# Patient Record
Sex: FEMALE | Race: WHITE | NOT HISPANIC OR LATINO | Employment: UNEMPLOYED | URBAN - METROPOLITAN AREA
[De-identification: names, ages, dates, MRNs, and addresses within clinical notes are randomized per-mention and may not be internally consistent; named-entity substitution may affect disease eponyms.]

---

## 2023-03-28 LAB — SEDIMENTATION RATE, ERYTHROCYTE: 49 MM/H (ref 0–20)

## 2023-04-11 LAB
C REACTIVE PROTEIN (MG/L) IN SER/PLAS: 1.53 MG/DL
SEDIMENTATION RATE, ERYTHROCYTE: 66 MM/H (ref 0–20)

## 2023-04-14 LAB — CITRULLINE ANTIBODY, IGG: 4 UNITS (ref 0–19)

## 2023-05-01 ENCOUNTER — APPOINTMENT (OUTPATIENT)
Dept: LAB | Facility: LAB | Age: 48
End: 2023-05-01
Payer: COMMERCIAL

## 2023-05-01 LAB
C REACTIVE PROTEIN (MG/L) IN SER/PLAS: 1.42 MG/DL
ERYTHROCYTE DISTRIBUTION WIDTH (RATIO) BY AUTOMATED COUNT: 14.7 % (ref 11.5–14.5)
ERYTHROCYTE MEAN CORPUSCULAR HEMOGLOBIN CONCENTRATION (G/DL) BY AUTOMATED: 31.1 G/DL (ref 32–36)
ERYTHROCYTE MEAN CORPUSCULAR VOLUME (FL) BY AUTOMATED COUNT: 84 FL (ref 80–100)
ERYTHROCYTES (10*6/UL) IN BLOOD BY AUTOMATED COUNT: 4.36 X10E12/L (ref 4–5.2)
HEMATOCRIT (%) IN BLOOD BY AUTOMATED COUNT: 36.7 % (ref 36–46)
HEMOGLOBIN (G/DL) IN BLOOD: 11.4 G/DL (ref 12–16)
LEUKOCYTES (10*3/UL) IN BLOOD BY AUTOMATED COUNT: 9.6 X10E9/L (ref 4.4–11.3)
NRBC (PER 100 WBCS) BY AUTOMATED COUNT: 0 /100 WBC (ref 0–0)
PLATELETS (10*3/UL) IN BLOOD AUTOMATED COUNT: 417 X10E9/L (ref 150–450)
SEDIMENTATION RATE, ERYTHROCYTE: 49 MM/H (ref 0–20)

## 2023-08-14 LAB
ALANINE AMINOTRANSFERASE (SGPT) (U/L) IN SER/PLAS: 19 U/L (ref 7–45)
ALBUMIN (G/DL) IN SER/PLAS: 4.2 G/DL (ref 3.4–5)
ALKALINE PHOSPHATASE (U/L) IN SER/PLAS: 94 U/L (ref 33–110)
ANION GAP IN SER/PLAS: 13 MMOL/L (ref 10–20)
ASPARTATE AMINOTRANSFERASE (SGOT) (U/L) IN SER/PLAS: 15 U/L (ref 9–39)
BILIRUBIN DIRECT (MG/DL) IN SER/PLAS: 0 MG/DL (ref 0–0.3)
BILIRUBIN TOTAL (MG/DL) IN SER/PLAS: 0.6 MG/DL (ref 0–1.2)
CALCIUM (MG/DL) IN SER/PLAS: 9.1 MG/DL (ref 8.6–10.3)
CARBON DIOXIDE, TOTAL (MMOL/L) IN SER/PLAS: 26 MMOL/L (ref 21–32)
CHLORIDE (MMOL/L) IN SER/PLAS: 104 MMOL/L (ref 98–107)
CREATININE (MG/DL) IN SER/PLAS: 0.84 MG/DL (ref 0.5–1.05)
ESTIMATED AVERAGE GLUCOSE FOR HBA1C: 180 MG/DL
GFR FEMALE: 85 ML/MIN/1.73M2
GLUCOSE (MG/DL) IN SER/PLAS: 134 MG/DL (ref 74–99)
HEMOGLOBIN A1C/HEMOGLOBIN TOTAL IN BLOOD: 7.9 %
POTASSIUM (MMOL/L) IN SER/PLAS: 4 MMOL/L (ref 3.5–5.3)
PROTEIN TOTAL: 7.3 G/DL (ref 6.4–8.2)
SODIUM (MMOL/L) IN SER/PLAS: 139 MMOL/L (ref 136–145)
UREA NITROGEN (MG/DL) IN SER/PLAS: 10 MG/DL (ref 6–23)

## 2023-09-14 ENCOUNTER — OFFICE VISIT (OUTPATIENT)
Dept: PRIMARY CARE | Facility: CLINIC | Age: 48
End: 2023-09-14
Payer: COMMERCIAL

## 2023-09-14 VITALS
WEIGHT: 237 LBS | BODY MASS INDEX: 37.2 KG/M2 | TEMPERATURE: 97 F | DIASTOLIC BLOOD PRESSURE: 76 MMHG | HEIGHT: 67 IN | HEART RATE: 90 BPM | SYSTOLIC BLOOD PRESSURE: 126 MMHG | OXYGEN SATURATION: 97 %

## 2023-09-14 DIAGNOSIS — E11.65 TYPE 2 DIABETES MELLITUS WITH HYPERGLYCEMIA, WITHOUT LONG-TERM CURRENT USE OF INSULIN (MULTI): Primary | ICD-10-CM

## 2023-09-14 DIAGNOSIS — F32.A ANXIETY AND DEPRESSION: ICD-10-CM

## 2023-09-14 DIAGNOSIS — F41.9 ANXIETY AND DEPRESSION: ICD-10-CM

## 2023-09-14 PROCEDURE — 3051F HG A1C>EQUAL 7.0%<8.0%: CPT | Performed by: INTERNAL MEDICINE

## 2023-09-14 PROCEDURE — 3074F SYST BP LT 130 MM HG: CPT | Performed by: INTERNAL MEDICINE

## 2023-09-14 PROCEDURE — 1036F TOBACCO NON-USER: CPT | Performed by: INTERNAL MEDICINE

## 2023-09-14 PROCEDURE — 3078F DIAST BP <80 MM HG: CPT | Performed by: INTERNAL MEDICINE

## 2023-09-14 PROCEDURE — 99213 OFFICE O/P EST LOW 20 MIN: CPT | Performed by: INTERNAL MEDICINE

## 2023-09-14 RX ORDER — MONTELUKAST SODIUM 10 MG/1
10 TABLET ORAL DAILY
COMMUNITY

## 2023-09-14 RX ORDER — FLUTICASONE PROPIONATE 50 MCG
SPRAY, SUSPENSION (ML) NASAL
COMMUNITY
Start: 2023-06-22 | End: 2024-03-28 | Stop reason: ALTCHOICE

## 2023-09-14 RX ORDER — ONDANSETRON 4 MG/1
4 TABLET, ORALLY DISINTEGRATING ORAL EVERY 8 HOURS PRN
COMMUNITY
Start: 2022-12-05 | End: 2024-02-20 | Stop reason: ALTCHOICE

## 2023-09-14 RX ORDER — PREGABALIN 50 MG/1
1 CAPSULE ORAL DAILY
COMMUNITY
Start: 2014-09-17

## 2023-09-14 RX ORDER — BLOOD-GLUCOSE TRANSMITTER
EACH MISCELLANEOUS
COMMUNITY
Start: 2023-08-14 | End: 2023-11-15 | Stop reason: SDUPTHER

## 2023-09-14 RX ORDER — KETOCONAZOLE 20 MG/ML
SHAMPOO, SUSPENSION TOPICAL EVERY OTHER DAY
COMMUNITY
End: 2024-01-15

## 2023-09-14 RX ORDER — LEVOTHYROXINE SODIUM 112 UG/1
112 TABLET ORAL DAILY
COMMUNITY

## 2023-09-14 RX ORDER — BLOOD-GLUCOSE SENSOR
EACH MISCELLANEOUS
COMMUNITY
Start: 2023-09-09

## 2023-09-14 RX ORDER — PAROXETINE 10 MG/1
10 TABLET, FILM COATED ORAL
COMMUNITY
End: 2023-12-14 | Stop reason: ALTCHOICE

## 2023-09-14 RX ORDER — FEXOFENADINE HCL AND PSEUDOEPHEDRINE HCI 180; 240 MG/1; MG/1
1 TABLET, EXTENDED RELEASE ORAL DAILY
COMMUNITY

## 2023-09-14 RX ORDER — FOLIC ACID 1 MG/1
1 TABLET ORAL DAILY
COMMUNITY
End: 2024-01-15 | Stop reason: SDUPTHER

## 2023-09-14 RX ORDER — ALBUTEROL SULFATE 90 UG/1
2 AEROSOL, METERED RESPIRATORY (INHALATION) 3 TIMES DAILY PRN
COMMUNITY
Start: 2022-05-31 | End: 2024-03-21 | Stop reason: SDUPTHER

## 2023-09-14 RX ORDER — IPRATROPIUM BROMIDE 17 UG/1
AEROSOL, METERED RESPIRATORY (INHALATION)
COMMUNITY

## 2023-09-14 RX ORDER — ERGOCALCIFEROL 1.25 MG/1
1 CAPSULE ORAL
COMMUNITY
End: 2024-04-29

## 2023-09-14 RX ORDER — LEVOTHYROXINE SODIUM 125 UG/1
125 TABLET ORAL
COMMUNITY
Start: 2020-04-07 | End: 2024-03-28 | Stop reason: ALTCHOICE

## 2023-09-14 RX ORDER — CYANOCOBALAMIN 1000 UG/ML
1 INJECTION, SOLUTION INTRAMUSCULAR; SUBCUTANEOUS
COMMUNITY
Start: 2022-02-17 | End: 2023-10-25 | Stop reason: ALTCHOICE

## 2023-09-14 RX ORDER — METRONIDAZOLE 7.5 MG/G
CREAM TOPICAL
COMMUNITY
Start: 2023-05-26 | End: 2024-03-28 | Stop reason: ALTCHOICE

## 2023-09-14 RX ORDER — BLOOD-GLUCOSE,RECEIVER,CONT
EACH MISCELLANEOUS
COMMUNITY
Start: 2023-08-14

## 2023-09-14 RX ORDER — DULAGLUTIDE 0.75 MG/.5ML
0.75 INJECTION, SOLUTION SUBCUTANEOUS
Qty: 2 ML | Refills: 1 | Status: SHIPPED | OUTPATIENT
Start: 2023-09-14 | End: 2023-09-27 | Stop reason: SINTOL

## 2023-09-14 RX ORDER — DULOXETIN HYDROCHLORIDE 30 MG/1
30 CAPSULE, DELAYED RELEASE ORAL DAILY
COMMUNITY

## 2023-09-14 RX ORDER — DESONIDE 0.5 MG/G
OINTMENT TOPICAL DAILY
COMMUNITY
Start: 2018-09-04 | End: 2023-10-25 | Stop reason: SDUPTHER

## 2023-09-14 RX ORDER — CLONAZEPAM 0.5 MG/1
0.5 TABLET ORAL 3 TIMES DAILY
COMMUNITY

## 2023-09-14 ASSESSMENT — PATIENT HEALTH QUESTIONNAIRE - PHQ9
1. LITTLE INTEREST OR PLEASURE IN DOING THINGS: NOT AT ALL
SUM OF ALL RESPONSES TO PHQ9 QUESTIONS 1 AND 2: 0
2. FEELING DOWN, DEPRESSED OR HOPELESS: NOT AT ALL

## 2023-09-14 NOTE — PROGRESS NOTES
"Subjective   Patient ID: Cydney Fox is a 47 y.o. female who presents for Follow-up (Follow up on medication. ).    HPI   Patient is here to discuss diabetes management.  She could not tolerate Jardiance due to GI side effects.  Using Dexcom and trying to change eating habits and has started exercising .  Still having right-sided pain and had a CT scan.  Has follow-up with GI.  CT showed fatty liver and hemangioma  Depression and anxiety controlled now.  Referral to new psychiatrist since current physician is out of network  Still trying to taper off Paxil  Still has ongoing dizziness  Review of Systems  No fever chills  No night sweats  No weight loss    No Rectal bleed  No numbness or tingling of feet  Objective   /76   Pulse 90   Temp 36.1 °C (97 °F)   Ht 1.702 m (5' 7\")   Wt 108 kg (237 lb)   SpO2 97%   BMI 37.12 kg/m²     Physical Exam    NAD  No pallor or icterus  Neck supple  Chest is clear  CVs: S1-S2 regular rate and rhythm no murmur  Abdomen soft mild epigastric and upper quadrant tenderness.  No rebound or guarding.  No mass  Assessment/Plan   Problem List Items Addressed This Visit       Type 2 diabetes mellitus with hyperglycemia, without long-term current use of insulin (CMS/[facility]) - Primary     She could not tolerate Jardiance.  Could not tolerate metformin in the past.  We will try her on Trulicity.  May cause more GI symptoms.  We will change if needed.  No contraindications  Continue exercise and diet plan.         Relevant Medications    dulaglutide (Trulicity) 0.75 mg/0.5 mL pen injector    Anxiety and depression     Symptoms are controlled with current treatment.  Referral to psychiatry         Relevant Orders    Referral to Psychiatry          "

## 2023-09-15 NOTE — ASSESSMENT & PLAN NOTE
She could not tolerate Jardiance.  Could not tolerate metformin in the past.  We will try her on Trulicity.  May cause more GI symptoms.  We will change if needed.  No contraindications  Continue exercise and diet plan.

## 2023-09-21 ENCOUNTER — TELEPHONE (OUTPATIENT)
Dept: PRIMARY CARE | Facility: CLINIC | Age: 48
End: 2023-09-21
Payer: COMMERCIAL

## 2023-09-21 NOTE — TELEPHONE ENCOUNTER
Discussed with patient.  She does not have hypoglycemic symptoms.  Verify with finger clinic and she is seeing lower number.  We will stop medication if she has symptoms or fingersticks shows low sugar

## 2023-09-25 ENCOUNTER — TELEPHONE (OUTPATIENT)
Dept: PRIMARY CARE | Facility: CLINIC | Age: 48
End: 2023-09-25
Payer: COMMERCIAL

## 2023-09-25 PROBLEM — R92.8 ABNORMAL MAMMOGRAM: Status: ACTIVE | Noted: 2023-09-25

## 2023-09-25 PROBLEM — M54.10: Status: ACTIVE | Noted: 2023-09-25

## 2023-09-25 PROBLEM — R11.0 NAUSEA IN ADULT: Status: ACTIVE | Noted: 2023-09-25

## 2023-09-25 PROBLEM — R93.3 ABNORMAL CT SCAN, COLON: Status: ACTIVE | Noted: 2023-09-25

## 2023-09-25 PROBLEM — J38.01 VOCAL CORD PARALYSIS, UNILATERAL COMPLETE: Status: ACTIVE | Noted: 2023-09-25

## 2023-09-25 PROBLEM — B37.9 YEAST INFECTION: Status: ACTIVE | Noted: 2023-09-25

## 2023-09-25 PROBLEM — R42 DIZZINESS: Status: ACTIVE | Noted: 2023-09-25

## 2023-09-25 PROBLEM — N76.0 BACTERIAL VAGINOSIS: Status: ACTIVE | Noted: 2023-09-25

## 2023-09-25 PROBLEM — R26.1 SPASTIC GAIT: Status: ACTIVE | Noted: 2023-09-25

## 2023-09-25 PROBLEM — E55.9 VITAMIN D DEFICIENCY: Status: ACTIVE | Noted: 2023-09-25

## 2023-09-25 PROBLEM — B96.89 BACTERIAL VAGINOSIS: Status: ACTIVE | Noted: 2023-09-25

## 2023-09-25 PROBLEM — M79.621 PAIN OF RIGHT UPPER ARM: Status: ACTIVE | Noted: 2023-09-25

## 2023-09-25 PROBLEM — E89.0 POSTOPERATIVE HYPOTHYROIDISM: Status: ACTIVE | Noted: 2023-09-25

## 2023-09-25 PROBLEM — R19.7 DIARRHEA: Status: ACTIVE | Noted: 2023-09-25

## 2023-09-25 PROBLEM — R73.09 ABNORMAL GLUCOSE: Status: ACTIVE | Noted: 2023-09-25

## 2023-09-25 PROBLEM — I77.1 CELIAC ARTERY STENOSIS (CMS-HCC): Status: ACTIVE | Noted: 2023-09-25

## 2023-09-25 PROBLEM — R14.0 ABDOMINAL BLOATING: Status: ACTIVE | Noted: 2023-09-25

## 2023-09-25 PROBLEM — I77.4 CELIAC ARTERY STENOSIS: Status: ACTIVE | Noted: 2023-09-25

## 2023-09-25 PROBLEM — M65.939 FCR (FLEXOR CARPI RADIALIS) TENOSYNOVITIS: Status: ACTIVE | Noted: 2023-06-21

## 2023-09-25 PROBLEM — N95.1 MENOPAUSAL SYMPTOM: Status: ACTIVE | Noted: 2023-09-25

## 2023-09-25 PROBLEM — R55 SYNCOPE: Status: ACTIVE | Noted: 2023-09-25

## 2023-09-25 PROBLEM — R74.01 TRANSAMINITIS: Status: ACTIVE | Noted: 2023-09-25

## 2023-09-25 PROBLEM — R73.9 HYPERGLYCEMIA: Status: ACTIVE | Noted: 2023-09-25

## 2023-09-25 PROBLEM — Z98.890 S/P TOTAL THYROIDECTOMY: Status: ACTIVE | Noted: 2023-09-25

## 2023-09-25 PROBLEM — R79.89 ABNORMAL COMPLETE BLOOD COUNT: Status: ACTIVE | Noted: 2023-09-25

## 2023-09-25 PROBLEM — J02.9 SORETHROAT: Status: ACTIVE | Noted: 2023-09-25

## 2023-09-25 PROBLEM — R06.02 SHORTNESS OF BREATH ON EXERTION: Status: ACTIVE | Noted: 2023-09-25

## 2023-09-25 PROBLEM — E53.8 FOLIC ACID DEFICIENCY: Status: ACTIVE | Noted: 2023-09-25

## 2023-09-25 PROBLEM — S46.311D: Status: ACTIVE | Noted: 2023-09-25

## 2023-09-25 PROBLEM — R26.81 UNSTEADY GAIT: Status: ACTIVE | Noted: 2023-09-25

## 2023-09-25 PROBLEM — R11.10 VOMITING: Status: ACTIVE | Noted: 2022-10-04

## 2023-09-25 PROBLEM — K27.9 PEPTIC ULCERATION: Status: ACTIVE | Noted: 2023-09-25

## 2023-09-25 PROBLEM — M75.01 ADHESIVE CAPSULITIS OF RIGHT SHOULDER: Status: ACTIVE | Noted: 2023-09-25

## 2023-09-25 PROBLEM — T21.15XA: Status: ACTIVE | Noted: 2023-09-25

## 2023-09-25 PROBLEM — S46.219A BICEPS MUSCLE STRAIN: Status: ACTIVE | Noted: 2023-09-25

## 2023-09-25 PROBLEM — M47.24 THORACIC RADICULOPATHY DUE TO DEGENERATIVE JOINT DISEASE OF SPINE: Status: ACTIVE | Noted: 2023-09-25

## 2023-09-25 PROBLEM — N92.6 IRREGULAR BLEEDING: Status: ACTIVE | Noted: 2023-09-25

## 2023-09-25 PROBLEM — J30.9 ALLERGIC RHINITIS: Status: ACTIVE | Noted: 2023-09-25

## 2023-09-25 PROBLEM — M25.539 WRIST PAIN: Status: ACTIVE | Noted: 2023-03-16

## 2023-09-25 PROBLEM — K29.70 GASTRITIS: Status: ACTIVE | Noted: 2023-09-25

## 2023-09-25 PROBLEM — N91.5 OLIGOMENORRHEA: Status: ACTIVE | Noted: 2023-09-25

## 2023-09-25 PROBLEM — N83.202 CYST OF LEFT OVARY: Status: ACTIVE | Noted: 2023-09-25

## 2023-09-25 PROBLEM — Z90.89 S/P TOTAL THYROIDECTOMY: Status: ACTIVE | Noted: 2023-09-25

## 2023-09-25 PROBLEM — C73 THYROID CANCER (MULTI): Status: ACTIVE | Noted: 2023-09-25

## 2023-09-25 PROBLEM — D32.9 MENINGIOMA (MULTI): Status: ACTIVE | Noted: 2023-09-25

## 2023-09-25 PROBLEM — R63.5 WEIGHT GAIN: Status: ACTIVE | Noted: 2023-09-25

## 2023-09-25 PROBLEM — J02.9 SORETHROAT: Status: RESOLVED | Noted: 2023-09-25 | Resolved: 2023-09-25

## 2023-09-25 PROBLEM — E87.6 HYPOKALEMIA: Status: ACTIVE | Noted: 2023-09-25

## 2023-09-25 PROBLEM — Z79.1 PATIENT TAKES NSAID (NON-STEROID ANTI-INFLAMMATORY DRUG): Status: ACTIVE | Noted: 2023-09-25

## 2023-09-25 PROBLEM — G89.4 PAIN SYNDROME, CHRONIC: Status: ACTIVE | Noted: 2023-07-20

## 2023-09-25 PROBLEM — N76.0 VAGINITIS: Status: ACTIVE | Noted: 2023-09-25

## 2023-09-25 PROBLEM — R10.13 ABDOMINAL PAIN, ACUTE, EPIGASTRIC: Status: ACTIVE | Noted: 2023-09-25

## 2023-09-25 PROBLEM — M25.511 RIGHT SHOULDER PAIN: Status: ACTIVE | Noted: 2023-09-25

## 2023-09-25 PROBLEM — R29.898 WEAKNESS OF BOTH LOWER EXTREMITIES: Status: ACTIVE | Noted: 2023-09-25

## 2023-09-25 PROBLEM — G95.9 CERVICAL MYELOPATHY (MULTI): Status: ACTIVE | Noted: 2023-09-25

## 2023-09-25 PROBLEM — M79.642 PAIN OF LEFT HAND: Status: ACTIVE | Noted: 2023-09-25

## 2023-09-25 PROBLEM — M54.40 LUMBAGO WITH SCIATICA: Status: ACTIVE | Noted: 2023-09-25

## 2023-09-25 PROBLEM — R10.13 ABDOMINAL PAIN, ACUTE, EPIGASTRIC: Status: RESOLVED | Noted: 2023-09-25 | Resolved: 2023-09-25

## 2023-09-25 PROBLEM — R10.30 LOWER ABDOMINAL PAIN: Status: ACTIVE | Noted: 2022-10-04

## 2023-09-25 PROBLEM — F32.A DEPRESSION: Status: ACTIVE | Noted: 2023-09-25

## 2023-09-25 PROBLEM — J32.9 RECURRENT SINUSITIS: Status: ACTIVE | Noted: 2023-09-25

## 2023-09-25 PROBLEM — F41.9 ANXIETY: Status: ACTIVE | Noted: 2023-09-25

## 2023-09-25 PROBLEM — M77.8 TENDONITIS OF WRIST, RIGHT: Status: ACTIVE | Noted: 2023-09-25

## 2023-09-25 PROBLEM — R49.0 DYSPHONIA: Status: ACTIVE | Noted: 2023-09-25

## 2023-09-25 PROBLEM — E78.2 HYPERLIPIDEMIA, MIXED: Status: ACTIVE | Noted: 2023-09-25

## 2023-09-25 PROBLEM — M65.9 TENOSYNOVITIS OF RIGHT HAND: Status: ACTIVE | Noted: 2023-03-23

## 2023-09-25 PROBLEM — M79.10 MYALGIA: Status: ACTIVE | Noted: 2023-09-25

## 2023-09-25 PROBLEM — R93.89 ABNORMAL CT SCAN, NECK: Status: ACTIVE | Noted: 2023-09-25

## 2023-09-25 PROBLEM — R10.2 ADNEXAL TENDERNESS: Status: ACTIVE | Noted: 2023-09-25

## 2023-09-25 PROBLEM — R13.10 DYSPHAGIA: Status: ACTIVE | Noted: 2023-09-25

## 2023-09-25 PROBLEM — L21.9 SEBORRHEIC DERMATITIS: Status: ACTIVE | Noted: 2023-09-25

## 2023-09-25 PROBLEM — M65.9 FCR (FLEXOR CARPI RADIALIS) TENOSYNOVITIS: Status: ACTIVE | Noted: 2023-06-21

## 2023-09-25 PROBLEM — L03.031 PARONYCHIA OF TOE OF RIGHT FOOT: Status: ACTIVE | Noted: 2023-09-25

## 2023-09-25 PROBLEM — G90.511 COMPLEX REGIONAL PAIN SYNDROME TYPE 1 OF RIGHT UPPER EXTREMITY: Status: ACTIVE | Noted: 2023-06-21

## 2023-09-25 PROBLEM — M19.90 ARTHRITIS: Status: ACTIVE | Noted: 2023-09-25

## 2023-09-25 PROBLEM — E89.0 S/P TOTAL THYROIDECTOMY: Status: ACTIVE | Noted: 2023-09-25

## 2023-09-25 PROBLEM — M47.14 THORACIC MYELOPATHY: Status: ACTIVE | Noted: 2023-09-25

## 2023-09-25 PROBLEM — J31.0 CHRONIC RHINITIS: Status: ACTIVE | Noted: 2023-09-25

## 2023-09-25 PROBLEM — M25.50 ARTHRALGIA: Status: ACTIVE | Noted: 2023-09-25

## 2023-09-25 PROBLEM — J45.909 ASTHMA (HHS-HCC): Status: ACTIVE | Noted: 2023-09-25

## 2023-09-25 PROBLEM — M54.6 THORACIC SPINE PAIN: Status: ACTIVE | Noted: 2023-09-25

## 2023-09-25 PROBLEM — K55.9 MESENTERIC ISCHEMIA (MULTI): Status: ACTIVE | Noted: 2023-09-25

## 2023-09-25 PROBLEM — R11.2 NAUSEA AND VOMITING: Status: ACTIVE | Noted: 2023-09-25

## 2023-09-25 PROBLEM — E53.8 B12 DEFICIENCY: Status: ACTIVE | Noted: 2023-09-25

## 2023-09-25 PROBLEM — Q38.1 ANKYLOGLOSSIA: Status: ACTIVE | Noted: 2023-09-25

## 2023-09-25 PROBLEM — M54.17 LUMBOSACRAL NEURITIS: Status: ACTIVE | Noted: 2023-09-25

## 2023-09-25 PROBLEM — M79.7 FIBROMYALGIA: Status: ACTIVE | Noted: 2023-09-25

## 2023-09-25 PROBLEM — R19.5 LOOSE STOOLS: Status: ACTIVE | Noted: 2022-10-04

## 2023-09-25 PROBLEM — R10.2 PELVIC PAIN IN FEMALE: Status: ACTIVE | Noted: 2023-09-25

## 2023-09-25 PROBLEM — R52 GENERALIZED PAIN: Status: ACTIVE | Noted: 2023-09-25

## 2023-09-25 PROBLEM — M25.60 MULTIPLE STIFF JOINTS: Status: ACTIVE | Noted: 2023-09-25

## 2023-09-25 PROBLEM — M65.941 TENOSYNOVITIS OF RIGHT HAND: Status: ACTIVE | Noted: 2023-03-23

## 2023-09-25 PROBLEM — N76.0 VULVOVAGINITIS: Status: ACTIVE | Noted: 2023-09-25

## 2023-09-25 PROBLEM — H60.90 OTITIS EXTERNA: Status: ACTIVE | Noted: 2023-09-25

## 2023-09-25 PROBLEM — M51.26 HERNIATION OF INTERVERTEBRAL DISC OF LUMBAR SPINE: Status: ACTIVE | Noted: 2023-09-25

## 2023-09-25 PROBLEM — R59.0 CERVICAL LYMPHADENOPATHY: Status: ACTIVE | Noted: 2023-09-25

## 2023-09-25 PROBLEM — L65.9 HAIR LOSS: Status: ACTIVE | Noted: 2023-09-25

## 2023-09-25 PROBLEM — S46.219A BICEPS MUSCLE TEAR: Status: ACTIVE | Noted: 2023-09-25

## 2023-09-25 PROBLEM — R73.03 PREDIABETES: Status: ACTIVE | Noted: 2023-09-25

## 2023-09-25 RX ORDER — CYANOCOBALAMIN (VITAMIN B-12) 3000MCG/ML
DROPS SUBLINGUAL
COMMUNITY
End: 2023-10-25 | Stop reason: ALTCHOICE

## 2023-09-25 RX ORDER — FAMOTIDINE 10 MG/1
10 TABLET ORAL 2 TIMES DAILY
COMMUNITY
End: 2024-03-28 | Stop reason: ALTCHOICE

## 2023-09-25 RX ORDER — PAROXETINE 10 MG/1
1.5 TABLET, FILM COATED ORAL EVERY OTHER DAY
COMMUNITY

## 2023-09-25 NOTE — TELEPHONE ENCOUNTER
Patients blood sugar has been very low since she took her first Trulicity injection. Right after it was at 60, right now it is 150 but she is concerned about taking a another one since her sugar is starting lower than last time. She wants to know if she should be lowering her dose or something else. Please advise.    Statement Selected

## 2023-09-27 DIAGNOSIS — E11.65 TYPE 2 DIABETES MELLITUS WITH HYPERGLYCEMIA, WITHOUT LONG-TERM CURRENT USE OF INSULIN (MULTI): Primary | ICD-10-CM

## 2023-09-27 NOTE — TELEPHONE ENCOUNTER
Patient is nervous about trying to control blood sugar with diet alone. She said that you mentioned trying a different medication if the Trulicity didn't work.

## 2023-10-05 ENCOUNTER — OFFICE VISIT (OUTPATIENT)
Dept: OTOLARYNGOLOGY | Facility: CLINIC | Age: 48
End: 2023-10-05
Payer: COMMERCIAL

## 2023-10-05 VITALS
BODY MASS INDEX: 36.92 KG/M2 | HEART RATE: 86 BPM | WEIGHT: 235.25 LBS | TEMPERATURE: 98.1 F | RESPIRATION RATE: 16 BRPM | DIASTOLIC BLOOD PRESSURE: 76 MMHG | HEIGHT: 67 IN | SYSTOLIC BLOOD PRESSURE: 114 MMHG

## 2023-10-05 DIAGNOSIS — R42 DIZZINESS: Primary | ICD-10-CM

## 2023-10-05 PROCEDURE — 99203 OFFICE O/P NEW LOW 30 MIN: CPT | Performed by: STUDENT IN AN ORGANIZED HEALTH CARE EDUCATION/TRAINING PROGRAM

## 2023-10-05 PROCEDURE — 3078F DIAST BP <80 MM HG: CPT | Performed by: STUDENT IN AN ORGANIZED HEALTH CARE EDUCATION/TRAINING PROGRAM

## 2023-10-05 PROCEDURE — 99213 OFFICE O/P EST LOW 20 MIN: CPT | Performed by: STUDENT IN AN ORGANIZED HEALTH CARE EDUCATION/TRAINING PROGRAM

## 2023-10-05 PROCEDURE — 1036F TOBACCO NON-USER: CPT | Performed by: STUDENT IN AN ORGANIZED HEALTH CARE EDUCATION/TRAINING PROGRAM

## 2023-10-05 PROCEDURE — 3051F HG A1C>EQUAL 7.0%<8.0%: CPT | Performed by: STUDENT IN AN ORGANIZED HEALTH CARE EDUCATION/TRAINING PROGRAM

## 2023-10-05 PROCEDURE — 3074F SYST BP LT 130 MM HG: CPT | Performed by: STUDENT IN AN ORGANIZED HEALTH CARE EDUCATION/TRAINING PROGRAM

## 2023-10-05 ASSESSMENT — PATIENT HEALTH QUESTIONNAIRE - PHQ9: 2. FEELING DOWN, DEPRESSED OR HOPELESS: NOT AT ALL

## 2023-10-05 ASSESSMENT — ENCOUNTER SYMPTOMS
LOSS OF SENSATION IN FEET: 0
DIZZINESS: 1
OCCASIONAL FEELINGS OF UNSTEADINESS: 0
DEPRESSION: 0

## 2023-10-05 ASSESSMENT — COLUMBIA-SUICIDE SEVERITY RATING SCALE - C-SSRS
2. HAVE YOU ACTUALLY HAD ANY THOUGHTS OF KILLING YOURSELF?: NO
6. HAVE YOU EVER DONE ANYTHING, STARTED TO DO ANYTHING, OR PREPARED TO DO ANYTHING TO END YOUR LIFE?: NO

## 2023-10-05 ASSESSMENT — PAIN SCALES - GENERAL: PAINLEVEL: 0-NO PAIN

## 2023-10-05 NOTE — PROGRESS NOTES
Subjective   Patient ID: Cydney Fox is a 48 y.o. female who presents for Dizziness.  She reports faintness and dizziness since May 2023. She reports that when she gets up from a seated position she will feel faint. Has also had episodes when walking. She reports room spinning vertigo and light-headedness as though she will pass out and fall. Gets some stabbing pain involving her whole head. She is also appreciating 3-5 minutes of bilateral pulsatile tinnitus which then resolve. She notes sensitive ear canals; as a history of environmental allergies. No history of ear surgery. Father has hearing loss; potentially work exposure.    Working with Dr. Zhang who has already obtained an audiogram (normal) and brain imaging (small meningioma, MRA normal). She has a pending VNG order. Autonomic testing was normal.    Dizziness        Review of Systems   Neurological:  Positive for dizziness.   All other systems reviewed and are negative.  Except as noted on patient intake form.    Objective   Physical Exam  Constitutional:       Appearance: Normal appearance.      Comments: Slight hoarseness with high pitch; patient previously seen by my colleague, Dr. Jane Longo, vocal cord paralysis.   HENT:      Right Ear: Tympanic membrane and external ear normal.      Left Ear: Tympanic membrane and external ear normal.      Ears:      Comments: Narrow ears with some dry cerumen.  Pulmonary:      Effort: Pulmonary effort is normal.   Neurological:      General: No focal deficit present.      Mental Status: She is alert.      Coordination: Romberg sign negative. Finger-Nose-Finger Test and Heel to Shin Test normal.      Comments: PERRLA  EOMI  No nystagmus  Finger to nose mild tremor with left UE  Fukuda Step Test normal - slight unsteadiness       Audiology:  I personally reviewed the patient's audiogram from today.  This demonstrated normal hearing with excellent word recognition scores, type a tympanograms, and preserved  acoustic reflexes bilaterally.     Radiology:  I reviewed the results of the patient prior MRI and MRA brain. These are reportedly normal with a small meningioma appreciated along the dura of the right falx.    Assessment/Plan   Problem List Items Addressed This Visit             ICD-10-CM    Dizziness - Primary R42     The patient reports symptoms of faintness, vertigo, and light-headedness. These episodes will occur randomly, but are more common when she stands.  She has had multiple normal exams including normal brain imaging, autonomic testing, and audiogram. Her physical exam demonstrated no obvious vestibular deficit.    The balance system was discussed in detail, and the etiologies of imbalance and dizziness were explained.  We discussed that the patient's symptoms and physical exam findings were not clearly suggestive of a vestibular insult.  I agree with Dr. Zhang's current plan which include VNG and further cardiac work-up. Depending on those results PT may be of benefit.  She can otherwise see me as needed.

## 2023-10-05 NOTE — ASSESSMENT & PLAN NOTE
The patient reports symptoms of faintness, vertigo, and light-headedness. These episodes will occur randomly, but are more common when she stands.  She has had multiple normal exams including normal brain imaging, autonomic testing, and audiogram. Her physical exam demonstrated no obvious vestibular deficit.    The balance system was discussed in detail, and the etiologies of imbalance and dizziness were explained.  We discussed that the patient's symptoms and physical exam findings were not clearly suggestive of a vestibular insult.  I agree with Dr. Zhang's current plan which include VNG and further cardiac work-up. Depending on those results PT may be of benefit.  She can otherwise see me as needed.

## 2023-10-05 NOTE — LETTER
October 5, 2023     Nicole Reza MD  [address]  [address]  [place] [address]    Patient: Cydney Fox   YOB: 1975   Date of Visit: 10/5/2023       Dear Dr. Nicole Reza MD:    Thank you for referring Cydney Fox to me for evaluation. Below are my notes for this consultation.  If you have questions, please do not hesitate to call me. I look forward to following your patient along with you.       Sincerely,     Rafa Armando MD      CC: Todd Zhang MD  ______________________________________________________________________________________    Subjective  Patient ID: Cydney Fox is a 48 y.o. female who presents for Dizziness.  She reports faintness and dizziness since May 2023. She reports that when she gets up from a seated position she will feel faint. Has also had episodes when walking. She reports room spinning vertigo and light-headedness as though she will pass out and fall. Gets some stabbing pain involving her whole head. She is also appreciating 3-5 minutes of bilateral pulsatile tinnitus which then resolve. She notes sensitive ear canals; as a history of environmental allergies. No history of ear surgery. Father has hearing loss; potentially work exposure.    Working with Dr. Zhang who has already obtained an audiogram (normal) and brain imaging (small meningioma, MRA normal). She has a pending VNG order. Autonomic testing was normal.    Dizziness        Review of Systems   Neurological:  Positive for dizziness.   All other systems reviewed and are negative.  Except as noted on patient intake form.    Objective  Physical Exam  Constitutional:       Appearance: Normal appearance.      Comments: Slight hoarseness with high pitch; patient previously seen by my colleague, Dr. Jane Longo, vocal cord paralysis.   HENT:      Right Ear: Tympanic membrane and external ear normal.      Left Ear: Tympanic membrane and external ear normal.      Ears:       Comments: Narrow ears with some dry cerumen.  Pulmonary:      Effort: Pulmonary effort is normal.   Neurological:      General: No focal deficit present.      Mental Status: She is alert.      Coordination: Romberg sign negative. Finger-Nose-Finger Test and Heel to Shin Test normal.      Comments: PERRLA  EOMI  No nystagmus  Finger to nose mild tremor with left UE  Fukuda Step Test normal - slight unsteadiness       Audiology:  I personally reviewed the patient's audiogram from today.  This demonstrated normal hearing with excellent word recognition scores, type a tympanograms, and preserved acoustic reflexes bilaterally.     Radiology:  I reviewed the results of the patient prior MRI and MRA brain. These are reportedly normal with a small meningioma appreciated along the dura of the right falx.    Assessment/Plan  Problem List Items Addressed This Visit             ICD-10-CM    Dizziness - Primary R42     The patient reports symptoms of faintness, vertigo, and light-headedness. These episodes will occur randomly, but are more common when she stands.  She has had multiple normal exams including normal brain imaging, autonomic testing, and audiogram. Her physical exam demonstrated no obvious vestibular deficit.    The balance system was discussed in detail, and the etiologies of imbalance and dizziness were explained.  We discussed that the patient's symptoms and physical exam findings were not clearly suggestive of a vestibular insult.  I agree with Dr. Zhang's current plan which include VNG and further cardiac work-up. Depending on those results PT may be of benefit.  She can otherwise see me as needed.

## 2023-10-18 ENCOUNTER — APPOINTMENT (OUTPATIENT)
Dept: PRIMARY CARE | Facility: CLINIC | Age: 48
End: 2023-10-18
Payer: COMMERCIAL

## 2023-10-25 ENCOUNTER — OFFICE VISIT (OUTPATIENT)
Dept: PRIMARY CARE | Facility: CLINIC | Age: 48
End: 2023-10-25
Payer: COMMERCIAL

## 2023-10-25 VITALS
OXYGEN SATURATION: 97 % | SYSTOLIC BLOOD PRESSURE: 118 MMHG | HEART RATE: 75 BPM | BODY MASS INDEX: 36.73 KG/M2 | DIASTOLIC BLOOD PRESSURE: 80 MMHG | WEIGHT: 234 LBS | HEIGHT: 67 IN

## 2023-10-25 DIAGNOSIS — L21.9 SEBORRHEIC DERMATITIS: ICD-10-CM

## 2023-10-25 DIAGNOSIS — E11.65 TYPE 2 DIABETES MELLITUS WITH HYPERGLYCEMIA, WITHOUT LONG-TERM CURRENT USE OF INSULIN (MULTI): Primary | ICD-10-CM

## 2023-10-25 PROCEDURE — 3051F HG A1C>EQUAL 7.0%<8.0%: CPT | Performed by: INTERNAL MEDICINE

## 2023-10-25 PROCEDURE — 3079F DIAST BP 80-89 MM HG: CPT | Performed by: INTERNAL MEDICINE

## 2023-10-25 PROCEDURE — 3074F SYST BP LT 130 MM HG: CPT | Performed by: INTERNAL MEDICINE

## 2023-10-25 PROCEDURE — 99213 OFFICE O/P EST LOW 20 MIN: CPT | Performed by: INTERNAL MEDICINE

## 2023-10-25 PROCEDURE — 1036F TOBACCO NON-USER: CPT | Performed by: INTERNAL MEDICINE

## 2023-10-25 RX ORDER — METFORMIN HYDROCHLORIDE 500 MG/1
500 TABLET, EXTENDED RELEASE ORAL
Qty: 30 TABLET | Refills: 11 | Status: SHIPPED | OUTPATIENT
Start: 2023-10-25 | End: 2024-02-20 | Stop reason: ALTCHOICE

## 2023-10-25 RX ORDER — DESONIDE 0.5 MG/G
OINTMENT TOPICAL DAILY
Qty: 15 G | Refills: 0 | Status: SHIPPED | OUTPATIENT
Start: 2023-10-25

## 2023-10-25 ASSESSMENT — PATIENT HEALTH QUESTIONNAIRE - PHQ9
SUM OF ALL RESPONSES TO PHQ9 QUESTIONS 1 AND 2: 5
6. FEELING BAD ABOUT YOURSELF - OR THAT YOU ARE A FAILURE OR HAVE LET YOURSELF OR YOUR FAMILY DOWN: SEVERAL DAYS
SUM OF ALL RESPONSES TO PHQ QUESTIONS 1-9: 13
3. TROUBLE FALLING OR STAYING ASLEEP OR SLEEPING TOO MUCH: MORE THAN HALF THE DAYS
1. LITTLE INTEREST OR PLEASURE IN DOING THINGS: NEARLY EVERY DAY
2. FEELING DOWN, DEPRESSED OR HOPELESS: MORE THAN HALF THE DAYS
9. THOUGHTS THAT YOU WOULD BE BETTER OFF DEAD, OR OF HURTING YOURSELF: NOT AT ALL
8. MOVING OR SPEAKING SO SLOWLY THAT OTHER PEOPLE COULD HAVE NOTICED. OR THE OPPOSITE, BEING SO FIGETY OR RESTLESS THAT YOU HAVE BEEN MOVING AROUND A LOT MORE THAN USUAL: MORE THAN HALF THE DAYS
4. FEELING TIRED OR HAVING LITTLE ENERGY: NEARLY EVERY DAY
7. TROUBLE CONCENTRATING ON THINGS, SUCH AS READING THE NEWSPAPER OR WATCHING TELEVISION: NOT AT ALL
10. IF YOU CHECKED OFF ANY PROBLEMS, HOW DIFFICULT HAVE THESE PROBLEMS MADE IT FOR YOU TO DO YOUR WORK, TAKE CARE OF THINGS AT HOME, OR GET ALONG WITH OTHER PEOPLE: EXTREMELY DIFFICULT
5. POOR APPETITE OR OVEREATING: NOT AT ALL

## 2023-10-25 NOTE — PROGRESS NOTES
"Subjective   Patient ID: Cydney Fox is a 48 y.o. female who presents for Diabetes (Discuss diabetes medication ).    HPI   Januvia caused a rash.Jardiance cause flank pain  Trulicity caused low sugar  Using dexcom  Watching diet  Fasting blood sugar 115average.does go up.  Still waiting to see psychiatrist  Dizziness is persisting.  Seen ENT  Working with pain management  Review of Systems  No fever or chills  No nausea vomiting or abdominal pain  Objective   /80   Pulse 75   Ht 1.702 m (5' 7\")   Wt 106 kg (234 lb)   SpO2 97%   BMI 36.65 kg/m²     Physical Exam  In NAD  No pallor or icterus  Chest is clear  CVS: S1-S2 regular rate and rhythm no edema  Assessment/Plan   Problem List Items Addressed This Visit             ICD-10-CM    Type 2 diabetes mellitus with hyperglycemia, without long-term current use of insulin (CMS/[facility]) - Primary E11.65    Relevant Medications    metFORMIN XR (Glucophage-XR) 500 mg 24 hr tablet    Seborrheic dermatitis L21.9    Relevant Medications    desonide (DesOwen) 0.05 % ointment        Continue diet control and increase activity as tolerated  We will try metformin 500 mg once a day  Continue monitoring  Call me with any side effects  "

## 2023-10-30 ENCOUNTER — TELEPHONE (OUTPATIENT)
Dept: PRIMARY CARE | Facility: CLINIC | Age: 48
End: 2023-10-30
Payer: COMMERCIAL

## 2023-10-30 NOTE — TELEPHONE ENCOUNTER
Patient would like to know what can she take in place of the Metformin ? She is not agreeing with her symptoms

## 2023-11-01 DIAGNOSIS — E11.65 TYPE 2 DIABETES MELLITUS WITH HYPERGLYCEMIA, WITHOUT LONG-TERM CURRENT USE OF INSULIN (MULTI): Primary | ICD-10-CM

## 2023-11-01 RX ORDER — PEN NEEDLE, DIABETIC 30 GX3/16"
NEEDLE, DISPOSABLE MISCELLANEOUS
Qty: 30 EACH | Refills: 0 | Status: SHIPPED | OUTPATIENT
Start: 2023-11-01 | End: 2024-02-20 | Stop reason: ALTCHOICE

## 2023-11-01 NOTE — TELEPHONE ENCOUNTER
Discussed with the patient.  We will try week dose.  Call me with any side effects.  Start with 0.6 mg daily

## 2023-11-01 NOTE — TELEPHONE ENCOUNTER
Patient did not realize it is an injectable med. She does not want this kind. Does it come in pill form?

## 2023-11-14 ENCOUNTER — APPOINTMENT (OUTPATIENT)
Dept: PRIMARY CARE | Facility: CLINIC | Age: 48
End: 2023-11-14
Payer: COMMERCIAL

## 2023-11-15 ENCOUNTER — TELEPHONE (OUTPATIENT)
Dept: PRIMARY CARE | Facility: CLINIC | Age: 48
End: 2023-11-15
Payer: COMMERCIAL

## 2023-11-15 DIAGNOSIS — E11.65 TYPE 2 DIABETES MELLITUS WITH HYPERGLYCEMIA, WITHOUT LONG-TERM CURRENT USE OF INSULIN (MULTI): Primary | ICD-10-CM

## 2023-11-15 RX ORDER — BLOOD-GLUCOSE TRANSMITTER
EACH MISCELLANEOUS
Qty: 1 EACH | Refills: 0 | Status: SHIPPED | OUTPATIENT
Start: 2023-11-15

## 2023-11-15 NOTE — TELEPHONE ENCOUNTER
Patient needs a prescription for a new dexcom G6 transmitter. It should be sent to the [facility] on file.

## 2023-11-22 DIAGNOSIS — E11.65 TYPE 2 DIABETES MELLITUS WITH HYPERGLYCEMIA, WITHOUT LONG-TERM CURRENT USE OF INSULIN (MULTI): Primary | ICD-10-CM

## 2023-11-22 RX ORDER — NATEGLINIDE 60 MG/1
60 TABLET ORAL
Qty: 60 TABLET | Refills: 1 | Status: SHIPPED | OUTPATIENT
Start: 2023-11-22 | End: 2024-01-29

## 2023-11-22 NOTE — TELEPHONE ENCOUNTER
Patient called in again regarding her diabetes. I see that the PA has been started but she is saying that she feels very sick and it's been two weeks without any medication. She would like to know what should be doing during this time while waiting ? She also mentioned that her insurance covers generic medications and that she would like to not continue the process of the Semaglutide.

## 2023-12-05 ENCOUNTER — APPOINTMENT (OUTPATIENT)
Dept: PRIMARY CARE | Facility: CLINIC | Age: 48
End: 2023-12-05
Payer: COMMERCIAL

## 2023-12-14 ENCOUNTER — OFFICE VISIT (OUTPATIENT)
Dept: PRIMARY CARE | Facility: CLINIC | Age: 48
End: 2023-12-14
Payer: COMMERCIAL

## 2023-12-14 VITALS
OXYGEN SATURATION: 97 % | DIASTOLIC BLOOD PRESSURE: 85 MMHG | WEIGHT: 233 LBS | HEIGHT: 67 IN | HEART RATE: 79 BPM | SYSTOLIC BLOOD PRESSURE: 125 MMHG | BODY MASS INDEX: 36.57 KG/M2 | TEMPERATURE: 97.9 F

## 2023-12-14 DIAGNOSIS — Z12.31 ENCOUNTER FOR SCREENING MAMMOGRAM FOR BREAST CANCER: ICD-10-CM

## 2023-12-14 DIAGNOSIS — M79.7 FIBROMYALGIA: ICD-10-CM

## 2023-12-14 DIAGNOSIS — E11.65 TYPE 2 DIABETES MELLITUS WITH HYPERGLYCEMIA, WITHOUT LONG-TERM CURRENT USE OF INSULIN (MULTI): Primary | ICD-10-CM

## 2023-12-14 PROBLEM — E11.42: Status: ACTIVE | Noted: 2023-11-29

## 2023-12-14 PROBLEM — M79.2 NEUROPATHIC PAIN: Status: ACTIVE | Noted: 2023-09-21

## 2023-12-14 PROBLEM — K76.0 FATTY LIVER: Status: ACTIVE | Noted: 2023-08-29

## 2023-12-14 LAB — POC HEMOGLOBIN A1C: 6.9 % (ref 4.2–6.5)

## 2023-12-14 PROCEDURE — 83036 HEMOGLOBIN GLYCOSYLATED A1C: CPT | Performed by: INTERNAL MEDICINE

## 2023-12-14 PROCEDURE — 3079F DIAST BP 80-89 MM HG: CPT | Performed by: INTERNAL MEDICINE

## 2023-12-14 PROCEDURE — 3074F SYST BP LT 130 MM HG: CPT | Performed by: INTERNAL MEDICINE

## 2023-12-14 PROCEDURE — 1036F TOBACCO NON-USER: CPT | Performed by: INTERNAL MEDICINE

## 2023-12-14 PROCEDURE — 99213 OFFICE O/P EST LOW 20 MIN: CPT | Performed by: INTERNAL MEDICINE

## 2023-12-14 PROCEDURE — 3051F HG A1C>EQUAL 7.0%<8.0%: CPT | Performed by: INTERNAL MEDICINE

## 2023-12-14 RX ORDER — EVENING PRIMROSE OIL 500 MG
CAPSULE ORAL DAILY
COMMUNITY
End: 2023-12-14 | Stop reason: ALTCHOICE

## 2023-12-14 RX ORDER — VITAMIN E MIXED 400 UNIT
CAPSULE ORAL DAILY
COMMUNITY

## 2023-12-14 RX ORDER — VITAMIN E MIXED 400 UNIT
400 CAPSULE ORAL DAILY
COMMUNITY
End: 2023-12-14 | Stop reason: ALTCHOICE

## 2023-12-14 ASSESSMENT — ENCOUNTER SYMPTOMS
HEADACHES: 0
DIFFICULTY URINATING: 0
WEAKNESS: 0
TREMORS: 0
UNEXPECTED WEIGHT CHANGE: 0
ACTIVITY CHANGE: 0
VOMITING: 0
DIARRHEA: 0
DIZZINESS: 1
NAUSEA: 0

## 2023-12-14 ASSESSMENT — PATIENT HEALTH QUESTIONNAIRE - PHQ9
2. FEELING DOWN, DEPRESSED OR HOPELESS: NOT AT ALL
SUM OF ALL RESPONSES TO PHQ9 QUESTIONS 1 AND 2: 0
1. LITTLE INTEREST OR PLEASURE IN DOING THINGS: NOT AT ALL

## 2023-12-14 NOTE — PROGRESS NOTES
"Subjective   Patient ID: Cydney Fox is a 48 y.o. female who presents for Follow-up (A1c also has tendinitis in her arm she wants looked at.).    HPI   On nateglinide  Bs still high  Watching diet  Seen endo  Thyroid checked  Still has pain allover  Has tenonitis arm    Review of Systems   Constitutional:  Negative for activity change and unexpected weight change.   Gastrointestinal:  Negative for diarrhea, nausea and vomiting.   Genitourinary:  Negative for difficulty urinating.   Neurological:  Positive for dizziness. Negative for tremors, weakness and headaches.       Objective   /85   Pulse 79   Temp 36.6 °C (97.9 °F)   Ht 1.702 m (5' 7\")   Wt 106 kg (233 lb)   SpO2 97%   BMI 36.49 kg/m²     Physical Exam  Constitutional:       Appearance: Normal appearance.   Cardiovascular:      Rate and Rhythm: Normal rate and regular rhythm.      Heart sounds: Normal heart sounds.   Pulmonary:      Breath sounds: Normal breath sounds.   Musculoskeletal:         General: Tenderness present.   Neurological:      Mental Status: She is alert.     Diffuse muscle tenderness    Assessment/Plan   Problem List Items Addressed This Visit             ICD-10-CM    Type 2 diabetes mellitus with hyperglycemia, without long-term current use of insulin (CMS/[facility]) - Primary E11.65    Relevant Orders    POCT glycosylated hemoglobin (Hb A1C) manually resulted (Completed)    Fibromyalgia M79.7    Relevant Orders    Referral to Physical Therapy     Diabetes control is improving.  Continue same treatment.  Need to start exercise  She has generalized pain and tendinitis.  Apply topical diclofenac  Referral to aquatic therapy   Schedule the mammogram  She is following up with endocrinology for thyroid cancer and diabetes.  Follow-up in February for annual checkup     "

## 2024-01-03 ENCOUNTER — ANCILLARY PROCEDURE (OUTPATIENT)
Dept: RADIOLOGY | Facility: CLINIC | Age: 49
End: 2024-01-03
Payer: COMMERCIAL

## 2024-01-03 DIAGNOSIS — Z12.31 ENCOUNTER FOR SCREENING MAMMOGRAM FOR BREAST CANCER: ICD-10-CM

## 2024-01-03 PROCEDURE — 77063 BREAST TOMOSYNTHESIS BI: CPT | Performed by: RADIOLOGY

## 2024-01-03 PROCEDURE — 77067 SCR MAMMO BI INCL CAD: CPT | Performed by: RADIOLOGY

## 2024-01-03 PROCEDURE — 77067 SCR MAMMO BI INCL CAD: CPT

## 2024-01-04 ENCOUNTER — TELEPHONE (OUTPATIENT)
Dept: PRIMARY CARE | Facility: CLINIC | Age: 49
End: 2024-01-04
Payer: COMMERCIAL

## 2024-01-04 NOTE — TELEPHONE ENCOUNTER
----- Message from Nicole Reza MD sent at 1/3/2024 12:59 PM EST -----  Advice Mammogram came back normal    Pt aware of the results.

## 2024-02-19 ASSESSMENT — PROMIS GLOBAL HEALTH SCALE
RATE_SOCIAL_SATISFACTION: VERY GOOD
RATE_GENERAL_HEALTH: POOR
CARRYOUT_SOCIAL_ACTIVITIES: FAIR
RATE_AVERAGE_PAIN: 8
EMOTIONAL_PROBLEMS: OFTEN
CARRYOUT_PHYSICAL_ACTIVITIES: A LITTLE
RATE_AVERAGE_FATIGUE: VERY SEVERE
RATE_PHYSICAL_HEALTH: POOR
RATE_QUALITY_OF_LIFE: POOR
RATE_MENTAL_HEALTH: FAIR

## 2024-02-20 ENCOUNTER — OFFICE VISIT (OUTPATIENT)
Dept: PRIMARY CARE | Facility: CLINIC | Age: 49
End: 2024-02-20
Payer: COMMERCIAL

## 2024-02-20 VITALS
RESPIRATION RATE: 20 BRPM | HEIGHT: 67 IN | OXYGEN SATURATION: 97 % | DIASTOLIC BLOOD PRESSURE: 74 MMHG | HEART RATE: 81 BPM | TEMPERATURE: 97.6 F | SYSTOLIC BLOOD PRESSURE: 113 MMHG | WEIGHT: 233.3 LBS | BODY MASS INDEX: 36.62 KG/M2

## 2024-02-20 DIAGNOSIS — Z00.00 ANNUAL PHYSICAL EXAM: Primary | ICD-10-CM

## 2024-02-20 DIAGNOSIS — E53.8 B12 DEFICIENCY: ICD-10-CM

## 2024-02-20 DIAGNOSIS — E11.42 CONTROLLED TYPE 2 DIABETES MELLITUS WITH DIABETIC POLYNEUROPATHY, WITHOUT LONG-TERM CURRENT USE OF INSULIN (MULTI): ICD-10-CM

## 2024-02-20 DIAGNOSIS — R10.30 LOWER ABDOMINAL PAIN: ICD-10-CM

## 2024-02-20 DIAGNOSIS — J45.30 MILD PERSISTENT ASTHMA WITHOUT COMPLICATION (HHS-HCC): ICD-10-CM

## 2024-02-20 DIAGNOSIS — G82.20 PARAPARESIS (MULTI): ICD-10-CM

## 2024-02-20 DIAGNOSIS — M79.7 FIBROMYALGIA: ICD-10-CM

## 2024-02-20 DIAGNOSIS — D32.9 MENINGIOMA (MULTI): ICD-10-CM

## 2024-02-20 PROCEDURE — 3074F SYST BP LT 130 MM HG: CPT | Performed by: INTERNAL MEDICINE

## 2024-02-20 PROCEDURE — 1036F TOBACCO NON-USER: CPT | Performed by: INTERNAL MEDICINE

## 2024-02-20 PROCEDURE — 3078F DIAST BP <80 MM HG: CPT | Performed by: INTERNAL MEDICINE

## 2024-02-20 PROCEDURE — 99396 PREV VISIT EST AGE 40-64: CPT | Performed by: INTERNAL MEDICINE

## 2024-02-20 ASSESSMENT — PAIN SCALES - GENERAL: PAINLEVEL: 2

## 2024-02-20 ASSESSMENT — COLUMBIA-SUICIDE SEVERITY RATING SCALE - C-SSRS: 1. IN THE PAST MONTH, HAVE YOU WISHED YOU WERE DEAD OR WISHED YOU COULD GO TO SLEEP AND NOT WAKE UP?: NO

## 2024-02-20 NOTE — PROGRESS NOTES
Subjective   Patient ID: Cydney Fox is a 48 y.o. female who presents for physical  HPI    Patient is here for annual exam  Has to see surgeon since new nodules seen on thyroid usg  Last exam was one  year ago, sees ophthalmologist regularly.    Consumes healthy diet    Does some  regular exercise,can only do few minutes at5 a time  pregnancy historyg0  No bladder symptoms  Cervical cancer screen current normal 2023  No history of abnormal Pap  Mammogram done  Colonoscopy yet to be completed      Review of Systems  GENERAL: No fever or chills.just tired  HEENT: No headache, no dizziness, no visual disturbance. No hearing loss. No nosebleeds. No sore throat or sinus pain. No ringing in ears.  RESP: No cough, has shortness of breath with exertion  CARDIO: No chest pain, palpitation, leg swelling. No PND or orthopnea.  GI: Has chronic abdominal pain,no nausea, vomiting, diarrhea, or constipation.had ct  : No dysuria, hematuria, or frequent urination. No incontinence  MSKl: has muscle pain  ENDO: No polyuria, polydipsia, no cold intolerance.  DERM:No rashes or itching.  PSYCH:Has anxiety and depression,controlled on meds.sees psych   HISTORY  Social History     Socioeconomic History    Marital status: Single     Spouse name: Not on file    Number of children: Not on file    Years of education: Not on file    Highest education level: Not on file   Occupational History    Not on file   Tobacco Use    Smoking status: Never    Smokeless tobacco: Never   Substance and Sexual Activity    Alcohol use: Not Currently    Drug use: Never    Sexual activity: Not on file   Other Topics Concern    Not on file   Social History Narrative    Not on file     Social Determinants of Health     Financial Resource Strain: Not on file   Food Insecurity: Not on file   Transportation Needs: Not on file   Physical Activity: Not on file   Stress: Not on file   Social Connections: Not on file   Intimate Partner Violence: Not on file    Housing Stability: Not on file     Family History   Problem Relation Name Age of Onset    Thyroid cancer Mother      Fibromyalgia Mother      Hypothyroidism Mother      Diabetes Father      Hyperlipidemia Father      Hypertension Father      Lung cancer Maternal Grandmother      Other (partial thyroidectomy) Paternal Grandmother      Breast cancer Father's Sister  65    Allergies Other Family History     Hypertension Other Family History     Hearing loss Other Family History      Past Medical History:   Diagnosis Date    Abdominal distension (gaseous) 02/16/2016    Abdominal bloating    Abnormal levels of other serum enzymes 02/16/2016    Elevated liver enzymes    Acute pharyngitis, unspecified 12/26/2014    Sore throat    Acute upper respiratory infection, unspecified 03/20/2018    Acute URI    Acute upper respiratory infection, unspecified 04/12/2017    Acute URI    Calculus of gallbladder without cholecystitis without obstruction 03/29/2016    Cholelithiasis without obstruction    Chondrocostal junction syndrome (tietze)     Costochondritis    Depression, unspecified 07/01/2022    Depression    Disease of gallbladder, unspecified     Gallbladder problem    Impacted cerumen, left ear 07/16/2019    Impacted cerumen of left ear    Localized enlarged lymph nodes 11/15/2017    Cervical lymphadenopathy    Nasal congestion     Nasal congestion    Noninfective gastroenteritis and colitis, unspecified 04/08/2016    Gastroenteritis, acute    Other amnesia     Memory problem    Other diseases of pharynx     Irritated throat    Otorrhea, unspecified ear     Ear drainage    Pelvic and perineal pain 06/30/2016    Adnexal tenderness    Personal history of diseases of the blood and blood-forming organs and certain disorders involving the immune mechanism 02/05/2014    History of leukocytosis    Personal history of malignant neoplasm of thyroid     History of malignant neoplasm of thyroid    Personal history of other diseases  of the circulatory system     History of mitral valve prolapse    Personal history of other diseases of the digestive system 08/16/2021    History of gastritis    Personal history of other diseases of the digestive system 02/05/2014    History of gastritis    Personal history of other diseases of the female genital tract 06/28/2016    History of irregular menstrual cycles    Personal history of other diseases of the musculoskeletal system and connective tissue     History of arthritis    Personal history of other diseases of the musculoskeletal system and connective tissue     Personal history of fibromyalgia    Personal history of other diseases of the respiratory system 03/29/2016    History of acute bronchitis    Personal history of other diseases of the respiratory system     Personal history of asthma    Personal history of other specified conditions     History of headache    Personal history of other specified conditions     History of shortness of breath    Personal history of other specified conditions 02/24/2016    History of nausea    Personal history of other specified conditions     History of wheezing    Personal history of other specified conditions 02/22/2016    History of abdominal pain    Sneezing     Sneezing    Unspecified asthma with (acute) exacerbation 03/28/2016    Asthma exacerbation    Unspecified epiphora, bilateral     Watery eyes     Past Surgical History:   Procedure Laterality Date    CHOLECYSTECTOMY  03/28/2016    Cholecystectomy    MR HEAD ANGIO WO IV CONTRAST  9/16/2022    MR HEAD ANGIO WO IV CONTRAST 9/16/2022 [facility]    MR NECK ANGIO WO IV CONTRAST  9/16/2022    MR NECK ANGIO WO IV CONTRAST 9/16/2022 [facility]    OTHER SURGICAL HISTORY  09/16/2021    Colonoscopy    TOTAL THYROIDECTOMY  12/15/2015    Thyroid Surgery Total Thyroidectomy       Objective   /74 (BP Location: Right arm, Patient Position: Sitting, BP Cuff Size: Adult)   Pulse 81   Temp 36.4 °C (97.6  "°F) (Temporal)   Resp 20   Ht 1.702 m (5' 7\")   Wt 106 kg (233 lb 4.8 oz)   SpO2 97%   BMI 36.54 kg/m²      Lab Results   Component Value Date    WBC 9.6 05/01/2023    HGB 11.4 (L) 05/01/2023    HCT 36.7 05/01/2023    MCV 84 05/01/2023     05/01/2023      Physical Exam  Constitutional: Alert and in no acute distress. Well developed, well nourished.   Head and Face: Head and face: Normal.    Eyes: Normal external exam. Pupils were equal in size, round, reactive to light (PERRL) with normal accommodation and extraocular movements intact (EOMI).   Ears, Nose, Mouth, and Throat: External inspection of ears and nose: Normal.  Hearing: Normal.  Nasal mucosa, septum, and turbinates: Normal.  Lips, teeth, and gums: Normal.  Oropharynx: Normal.    Neck: Neck: Abnormal.  scar. thyroidecotmy.   Cardiovascular: Heart rate and rhythm were normal, normal S1 and S2, no gallops, no murmurs and no pericardial rub. Carotid pulses: Normal with no bruits. Pedal pulses: Normal. No peripheral edema.   Pulmonary: No respiratory distress. Clear bilateral breath sounds.   Abdomen: Abdomen: Abnormal.  diffuse tenderness which is c/c. No organomegaly.   Musculoskeletal: No joint swelling seen, normal movements of all extremities. Range of motion: Normal.  Muscle strength/tone: Normal.    Skin: Normal skin color and pigmentation, normal skin turgor, and no rash.   Psychiatric: Judgment and insight: Intact. Mood and affect: Normal.   Lymphatic: No cervical lymphadenopathy.   Assessment/Plan   Problem List Items Addressed This Visit       Asthma    Relevant Orders    Disability Placard    B12 deficiency    Relevant Orders    Vitamin B12    Lower abdominal pain    Relevant Orders    Breath Hydrogen Test-Bacterial Overgrowth    Meningioma (CMS/HCC)    Fibromyalgia    Relevant Orders    Disability Placard    Paraparesis (CMS/HCC)    Annual physical exam - Primary    Relevant Orders    CBC and Auto Differential    Comprehensive Metabolic " Panel    Lipid Panel    Controlled type 2 diabetes mellitus with diabetic polyneuropathy (CMS/HCC)    Relevant Orders    CBC and Auto Differential    Comprehensive Metabolic Panel   Preventive exam and counseling completed.  She has multiple ongoing medical issues  Recent endocrinology evaluation showed nodes by her thyroid.  Scheduled to have CT neck and follow-up with surgeon which has been scheduled  Has ongoing pain and sees pain management  Diabetes is controlled and thyroid levels have been checked  Seeing psychiatry  Sees neurosurgery annually for meningioma follow-up  Had to schedule colonoscopy.  Had multiple times with prep  Routine labs ordered  Recommended to continue with diet changes and exercise as tolerated.  Try to avoid processed foods sugar gluten and dairy.  Eat more plant-based foods.  Will do a breath test to assess for SIBO  She has chronic abdominal pain and tenderness  Follow-up in 1 year and as needed

## 2024-02-26 DIAGNOSIS — E11.65 TYPE 2 DIABETES MELLITUS WITH HYPERGLYCEMIA, WITHOUT LONG-TERM CURRENT USE OF INSULIN (MULTI): ICD-10-CM

## 2024-02-26 RX ORDER — NATEGLINIDE 60 MG/1
60 TABLET ORAL
Qty: 60 TABLET | Refills: 0 | OUTPATIENT
Start: 2024-02-26 | End: 2024-03-27

## 2024-02-27 NOTE — TELEPHONE ENCOUNTER
Spoke with Cydney.  She said the script for Starlix (nateglinide) was straightened out with her endo.

## 2024-02-29 ENCOUNTER — OFFICE VISIT (OUTPATIENT)
Dept: NEUROLOGY | Facility: CLINIC | Age: 49
End: 2024-02-29
Payer: COMMERCIAL

## 2024-02-29 ENCOUNTER — LAB (OUTPATIENT)
Dept: LAB | Facility: LAB | Age: 49
End: 2024-02-29
Payer: COMMERCIAL

## 2024-02-29 VITALS
SYSTOLIC BLOOD PRESSURE: 126 MMHG | DIASTOLIC BLOOD PRESSURE: 82 MMHG | HEART RATE: 110 BPM | BODY MASS INDEX: 36.35 KG/M2 | RESPIRATION RATE: 16 BRPM | HEIGHT: 67 IN | TEMPERATURE: 97.6 F | WEIGHT: 231.6 LBS

## 2024-02-29 DIAGNOSIS — R55 SYNCOPE, UNSPECIFIED SYNCOPE TYPE: ICD-10-CM

## 2024-02-29 DIAGNOSIS — M47.14 THORACIC MYELOPATHY: ICD-10-CM

## 2024-02-29 DIAGNOSIS — G95.9 CERVICAL MYELOPATHY (MULTI): ICD-10-CM

## 2024-02-29 DIAGNOSIS — F32.A ANXIETY AND DEPRESSION: ICD-10-CM

## 2024-02-29 DIAGNOSIS — R42 DIZZINESS: ICD-10-CM

## 2024-02-29 DIAGNOSIS — G90.511 COMPLEX REGIONAL PAIN SYNDROME TYPE 1 OF RIGHT UPPER EXTREMITY: ICD-10-CM

## 2024-02-29 DIAGNOSIS — R26.81 UNSTEADY GAIT: ICD-10-CM

## 2024-02-29 DIAGNOSIS — M79.2 NEUROPATHIC PAIN: ICD-10-CM

## 2024-02-29 DIAGNOSIS — E11.42 CONTROLLED TYPE 2 DIABETES MELLITUS WITH DIABETIC POLYNEUROPATHY, WITHOUT LONG-TERM CURRENT USE OF INSULIN (MULTI): ICD-10-CM

## 2024-02-29 DIAGNOSIS — M54.6 THORACIC SPINE PAIN: ICD-10-CM

## 2024-02-29 DIAGNOSIS — E53.8 B12 DEFICIENCY: ICD-10-CM

## 2024-02-29 DIAGNOSIS — F41.9 ANXIETY AND DEPRESSION: ICD-10-CM

## 2024-02-29 DIAGNOSIS — M54.17 LUMBOSACRAL NEURITIS: ICD-10-CM

## 2024-02-29 DIAGNOSIS — Z00.00 ANNUAL PHYSICAL EXAM: ICD-10-CM

## 2024-02-29 DIAGNOSIS — R42 DIZZINESS: Primary | ICD-10-CM

## 2024-02-29 DIAGNOSIS — M79.10 MYALGIA: ICD-10-CM

## 2024-02-29 DIAGNOSIS — M79.7 FIBROMYALGIA: ICD-10-CM

## 2024-02-29 LAB
ALBUMIN SERPL BCP-MCNC: 4.4 G/DL (ref 3.4–5)
ALP SERPL-CCNC: 108 U/L (ref 33–110)
ALT SERPL W P-5'-P-CCNC: 19 U/L (ref 7–45)
ANION GAP SERPL CALC-SCNC: 15 MMOL/L (ref 10–20)
AST SERPL W P-5'-P-CCNC: 19 U/L (ref 9–39)
BASOPHILS # BLD AUTO: 0.06 X10*3/UL (ref 0–0.1)
BASOPHILS NFR BLD AUTO: 0.7 %
BILIRUB SERPL-MCNC: 0.7 MG/DL (ref 0–1.2)
BUN SERPL-MCNC: 11 MG/DL (ref 6–23)
CALCIUM SERPL-MCNC: 9 MG/DL (ref 8.6–10.3)
CHLORIDE SERPL-SCNC: 103 MMOL/L (ref 98–107)
CHOLEST SERPL-MCNC: 267 MG/DL (ref 0–199)
CHOLESTEROL/HDL RATIO: 4.6
CO2 SERPL-SCNC: 27 MMOL/L (ref 21–32)
CREAT SERPL-MCNC: 0.87 MG/DL (ref 0.5–1.05)
EGFRCR SERPLBLD CKD-EPI 2021: 82 ML/MIN/1.73M*2
EOSINOPHIL # BLD AUTO: 0.17 X10*3/UL (ref 0–0.7)
EOSINOPHIL NFR BLD AUTO: 1.9 %
ERYTHROCYTE [DISTWIDTH] IN BLOOD BY AUTOMATED COUNT: 15.4 % (ref 11.5–14.5)
GLUCOSE SERPL-MCNC: 127 MG/DL (ref 74–99)
HCT VFR BLD AUTO: 40.3 % (ref 36–46)
HDLC SERPL-MCNC: 58.3 MG/DL
HGB BLD-MCNC: 12.8 G/DL (ref 12–16)
IMM GRANULOCYTES # BLD AUTO: 0.04 X10*3/UL (ref 0–0.7)
IMM GRANULOCYTES NFR BLD AUTO: 0.5 % (ref 0–0.9)
LDLC SERPL CALC-MCNC: 178 MG/DL
LYMPHOCYTES # BLD AUTO: 1.98 X10*3/UL (ref 1.2–4.8)
LYMPHOCYTES NFR BLD AUTO: 22.4 %
MCH RBC QN AUTO: 26.7 PG (ref 26–34)
MCHC RBC AUTO-ENTMCNC: 31.8 G/DL (ref 32–36)
MCV RBC AUTO: 84 FL (ref 80–100)
MONOCYTES # BLD AUTO: 0.55 X10*3/UL (ref 0.1–1)
MONOCYTES NFR BLD AUTO: 6.2 %
NEUTROPHILS # BLD AUTO: 6.04 X10*3/UL (ref 1.2–7.7)
NEUTROPHILS NFR BLD AUTO: 68.3 %
NON HDL CHOLESTEROL: 209 MG/DL (ref 0–149)
NRBC BLD-RTO: 0 /100 WBCS (ref 0–0)
PLATELET # BLD AUTO: 494 X10*3/UL (ref 150–450)
POTASSIUM SERPL-SCNC: 4.8 MMOL/L (ref 3.5–5.3)
PROT SERPL-MCNC: 7 G/DL (ref 6.4–8.2)
RBC # BLD AUTO: 4.79 X10*6/UL (ref 4–5.2)
SODIUM SERPL-SCNC: 140 MMOL/L (ref 136–145)
TRIGL SERPL-MCNC: 152 MG/DL (ref 0–149)
VIT B12 SERPL-MCNC: 898 PG/ML (ref 211–911)
VLDL: 30 MG/DL (ref 0–40)
WBC # BLD AUTO: 8.8 X10*3/UL (ref 4.4–11.3)

## 2024-02-29 PROCEDURE — 82607 VITAMIN B-12: CPT

## 2024-02-29 PROCEDURE — 1036F TOBACCO NON-USER: CPT | Performed by: PSYCHIATRY & NEUROLOGY

## 2024-02-29 PROCEDURE — 80053 COMPREHEN METABOLIC PANEL: CPT

## 2024-02-29 PROCEDURE — 3079F DIAST BP 80-89 MM HG: CPT | Performed by: PSYCHIATRY & NEUROLOGY

## 2024-02-29 PROCEDURE — 99215 OFFICE O/P EST HI 40 MIN: CPT | Performed by: PSYCHIATRY & NEUROLOGY

## 2024-02-29 PROCEDURE — 80061 LIPID PANEL: CPT

## 2024-02-29 PROCEDURE — 3074F SYST BP LT 130 MM HG: CPT | Performed by: PSYCHIATRY & NEUROLOGY

## 2024-02-29 PROCEDURE — 36415 COLL VENOUS BLD VENIPUNCTURE: CPT

## 2024-02-29 PROCEDURE — 85025 COMPLETE CBC W/AUTO DIFF WBC: CPT

## 2024-02-29 RX ORDER — MAGNESIUM 250 MG
250 TABLET ORAL DAILY
COMMUNITY
End: 2024-03-28 | Stop reason: ALTCHOICE

## 2024-02-29 ASSESSMENT — ENCOUNTER SYMPTOMS
DEPRESSION: 0
OCCASIONAL FEELINGS OF UNSTEADINESS: 0
LOSS OF SENSATION IN FEET: 0

## 2024-02-29 NOTE — PROGRESS NOTES
Subjective     Cydney Fox 48 y.o.  HPI  The patient states that she is still having episodes of dizziness that occur about 4-5 times a day and will last for about 1 minute.  The patient states that they typically will occur when she gets up from sitting or lying down.    The patient states that she is still having back pain that she rates at approximately a 6 out of 10.    The patient is still having severe muscle aches and is now following with pain management.  The patient is compliant with her Cymbalta and Lyrica.    The patient did see Dr. Armando in ENT who did not feel that the patient had a vestibular basis for dizziness.    The patient's bone scan was normal.  The patient's EEG was normal.  The patient did not get her VNG, echocardiogram or autonomic testing.    The patient states that she is seeing an endocrinologist to ensure that her thyroid cancer has not returned.  She does have a CT scan of the neck coming up.    Review of Systems   All other systems reviewed and are negative.       Patient Active Problem List   Diagnosis    Type 2 diabetes mellitus with hyperglycemia, without long-term current use of insulin (CMS/HCC)    Anxiety and depression    Abnormal complete blood count    Abnormal CT scan, colon    Abnormal CT scan, neck    Abnormal mammogram    Abnormal glucose    Adhesive capsulitis of right shoulder    Tenosynovitis of right hand    Adnexal tenderness    Chronic rhinitis    Anxiety    Asthma    B12 deficiency    Bacterial vaginosis    Biceps muscle tear    Celiac artery stenosis (CMS/HCC)    Cervical lymphadenopathy    Cervical myelopathy (CMS/HCC)    Thoracic myelopathy    Complex regional pain syndrome type 1 of right upper extremity    Cyst of left ovary    Diarrhea    Dizziness    Dysphagia    Dysphonia    FCR (flexor carpi radialis) tenosynovitis    First degree burn of buttock    Gastritis    Vaginitis    Vulvovaginitis    Generalized pain    Hair loss    Herniation of  intervertebral disc of lumbar spine    Hyperlipidemia, mixed    Hypokalemia    Impingement of vertebral body syndrome    Irregular bleeding    Loose stools    Lower abdominal pain    Lumbago with sciatica    Lumbosacral neuritis    Meningioma (CMS/HCC)    Menopausal symptom    Mesenteric ischemia (CMS/HCC)    Oligomenorrhea    Otitis externa    Pain of left hand    Fibromyalgia    Pain syndrome, chronic    Paronychia of toe of right foot    Pelvic pain in female    Nausea and vomiting    Nausea in adult    Peptic ulceration    Vomiting    Postoperative hypothyroidism    Prediabetes    Recurrent sinusitis    Pain of right upper arm    Right shoulder pain    S/P total thyroidectomy    Seborrheic dermatitis    Shortness of breath on exertion    Spastic gait    Biceps muscle strain    Syncope    Thoracic radiculopathy due to degenerative joint disease of spine    Thyroid cancer (CMS/HCC)    Transaminitis    Unsteady gait    Folic acid deficiency    Vitamin D deficiency    Vocal cord paralysis, unilateral complete    Paraparesis (CMS/HCC)    Weight gain    Wrist pain    Yeast infection    Depression    Abdominal bloating    Allergic rhinitis    Ankyloglossia    Arthralgia    Arthritis    Hyperglycemia    Multiple stiff joints    Myalgia    Annual physical exam    Strain of triceps muscle, right, subsequent encounter    Tendonitis of wrist, right    Thoracic spine pain    Fatty liver    Neuropathic pain    Controlled type 2 diabetes mellitus with diabetic polyneuropathy (CMS/HCC)        Past Medical History:   Diagnosis Date    Abdominal distension (gaseous) 02/16/2016    Abdominal bloating    Abnormal levels of other serum enzymes 02/16/2016    Elevated liver enzymes    Acute pharyngitis, unspecified 12/26/2014    Sore throat    Acute upper respiratory infection, unspecified 03/20/2018    Acute URI    Acute upper respiratory infection, unspecified 04/12/2017    Acute URI    Calculus of gallbladder without cholecystitis  without obstruction 03/29/2016    Cholelithiasis without obstruction    Chondrocostal junction syndrome (tietze)     Costochondritis    Depression, unspecified 07/01/2022    Depression    Disease of gallbladder, unspecified     Gallbladder problem    Impacted cerumen, left ear 07/16/2019    Impacted cerumen of left ear    Localized enlarged lymph nodes 11/15/2017    Cervical lymphadenopathy    Nasal congestion     Nasal congestion    Noninfective gastroenteritis and colitis, unspecified 04/08/2016    Gastroenteritis, acute    Other amnesia     Memory problem    Other diseases of pharynx     Irritated throat    Otorrhea, unspecified ear     Ear drainage    Pelvic and perineal pain 06/30/2016    Adnexal tenderness    Personal history of diseases of the blood and blood-forming organs and certain disorders involving the immune mechanism 02/05/2014    History of leukocytosis    Personal history of malignant neoplasm of thyroid     History of malignant neoplasm of thyroid    Personal history of other diseases of the circulatory system     History of mitral valve prolapse    Personal history of other diseases of the digestive system 08/16/2021    History of gastritis    Personal history of other diseases of the digestive system 02/05/2014    History of gastritis    Personal history of other diseases of the female genital tract 06/28/2016    History of irregular menstrual cycles    Personal history of other diseases of the musculoskeletal system and connective tissue     History of arthritis    Personal history of other diseases of the musculoskeletal system and connective tissue     Personal history of fibromyalgia    Personal history of other diseases of the respiratory system 03/29/2016    History of acute bronchitis    Personal history of other diseases of the respiratory system     Personal history of asthma    Personal history of other specified conditions     History of headache    Personal history of other specified  conditions     History of shortness of breath    Personal history of other specified conditions 02/24/2016    History of nausea    Personal history of other specified conditions     History of wheezing    Personal history of other specified conditions 02/22/2016    History of abdominal pain    Sneezing     Sneezing    Unspecified asthma with (acute) exacerbation 03/28/2016    Asthma exacerbation    Unspecified epiphora, bilateral     Watery eyes        Past Surgical History:   Procedure Laterality Date    CHOLECYSTECTOMY  03/28/2016    Cholecystectomy    MR HEAD ANGIO WO IV CONTRAST  9/16/2022    MR HEAD ANGIO WO IV CONTRAST 9/16/2022 [facility]    MR NECK ANGIO WO IV CONTRAST  9/16/2022    MR NECK ANGIO WO IV CONTRAST 9/16/2022 [facility]    OTHER SURGICAL HISTORY  09/16/2021    Colonoscopy    TOTAL THYROIDECTOMY  12/15/2015    Thyroid Surgery Total Thyroidectomy        Social History     Socioeconomic History    Marital status: Single     Spouse name: Not on file    Number of children: Not on file    Years of education: Not on file    Highest education level: Not on file   Occupational History    Not on file   Tobacco Use    Smoking status: Never     Passive exposure: Never    Smokeless tobacco: Never   Substance and Sexual Activity    Alcohol use: Not Currently    Drug use: Never    Sexual activity: Not on file   Other Topics Concern    Not on file   Social History Narrative    Not on file     Social Determinants of Health     Financial Resource Strain: Not on file   Food Insecurity: Not on file   Transportation Needs: Not on file   Physical Activity: Not on file   Stress: Not on file   Social Connections: Not on file   Intimate Partner Violence: Not on file   Housing Stability: Not on file        Family History   Problem Relation Name Age of Onset    Thyroid cancer Mother      Fibromyalgia Mother      Hypothyroidism Mother      Diabetes Father      Hyperlipidemia Father      Hypertension Father      Lung  cancer Maternal Grandmother      Other (partial thyroidectomy) Paternal Grandmother      Breast cancer Father's Sister  65    Allergies Other Family History     Hypertension Other Family History     Hearing loss Other Family History         Current Outpatient Medications   Medication Instructions    Atrovent HFA 17 mcg/actuation inhaler INHALE 1 OR 2 PUFFS 4 TIMES DAILY AS NEEDED.    clonazePAM (KLONOPIN) 0.5 mg, oral, 3 times daily    cyanocobalamin, vitamin B-12, (VITAMIN B-12 ORAL) oral, Vitamin b-12 liquid 500mg spray. Use as directed    desonide (DesOwen) 0.05 % ointment Topical, Daily    Dexcom G6  misc USE AS DIRECTED    Dexcom G6 Sensor device CHANGE SENSOR EVERY 10 DAYS AS DIRECTED    Dexcom G6 Transmitter device USE AS DIRECTED    DULoxetine (CYMBALTA) 30 mg, oral, Daily    ergocalciferol (Vitamin D-2) 1.25 MG (89806 UT) capsule 1 capsule, oral, Weekly    famotidine (PEPCID) 10 mg, oral, 2 times daily    fexofenadine-pseudoephedrine (Allegra-D 24) 180-240 mg 24 hr tablet 1 tablet, oral, Daily    fluticasone (Flonase) 50 mcg/actuation nasal spray SPRAY ONCE DAILY INTO NOSTRIL FOR 30 DAYS    folic acid (FOLVITE) 1 mg, oral, Daily    ketoconazole (NIZOral) 2 % shampoo Every other day, Apply to scalp every other day for 5 minutes and rinse    levothyroxine (SYNTHROID, LEVOXYL) 112 mcg, oral, Daily    levothyroxine (SYNTHROID, LEVOXYL) 125 mcg, Take every other day alternate with 112 mcg    magnesium 250 mg, oral, Daily    metroNIDAZOLE (Metrocream) 0.75 % cream APPLY A PEA SIZE AMOUNT TO THE FACE 1-2 TIMES A DAY    montelukast (SINGULAIR) 10 mg, oral, Daily    nateglinide (STARLIX) 60 mg, oral, 2 times daily with meals    PARoxetine (Paxil) 10 mg tablet 1.5 tablets, oral, Every other day    pregabalin (Lyrica) 50 mg capsule 1 capsule, oral, Daily    TURMERIC ROOT-GINGER ROOT EXT ORAL 1 tablet, oral, Daily, 150-25mg oral tablet chewable.    Ventolin HFA 90 mcg/actuation inhaler 2 puffs, inhalation, 3  "times daily PRN    vitamin E 90 mg (200 unit) capsule oral, Daily        Allergies   Allergen Reactions    Jardiance [Empagliflozin] GI Upset    Bee Pollen Other    Codeine Nausea/vomiting    Daypro [Oxaprozin] Unknown    Gluten Unknown    House Dust Other    Milk Containing Products (Dairy) Unknown    Sulfa (Sulfonamide Antibiotics) Unknown, Nausea Only and Other    Cefaclor Rash and Unknown     Other reaction(s): rash childhood        Objective  /82 (BP Location: Left arm)   Pulse 110   Temp 36.4 °C (97.6 °F)   Resp 16   Ht 1.702 m (5' 7\")   Wt 105 kg (231 lb 9.6 oz)   BMI 36.27 kg/m²    GENERAL APPEARANCE:  No distress, alert and cooperative.     MENTAL STATE:  Orientation was normal to time, place and person. Recent and remote memory was intact.  Attention span and concentration were normal. Language testing was normal for comprehension, repetition, expression, and naming. Calculation was intact. The patient could correctly interpret a picture, and copy a diagram. General fund of knowledge was intact. Mini-mental status examination was performed with no errors.     CRANIAL NERVES:  Cranial nerves were normal.      CN 2- Visual Acuity  OD: 20/20 (corrected)   OS: 20/20 (corrected); visual fields full to confrontation.      CN 3, 4, 6-  Pupils round, 4 mm in diameter, equally reactive to light. No ptosis. EOMs normal alignment, full range of movement, no nystagmus     CN 5- Facial sensation intact bilaterally. Normal corneal reflexes.      CN 7- Normal and symmetric facial strength. Nasolabial folds symmetric.     CN 8- Hearing intact to finger rub, whisper.      CN 9- Palate elevates symmetrically. Normal gag reflex.      CN 11- Normal strength of shoulder shrug and neck turning      CN 12- Tongue midline, with normal bulk and strength; no fasciculations.     MOTOR:  Motor exam was normal. Muscle bulk and tone were normal in both upper and lower extremities. Muscle strength was 5/5 in distal and " proximal muscles in both upper and lower extremities. No fasciculations, tremor or other abnormal movements were present.     GAIT: Station was stable with a normal base and negative Romberg sign. Gait was stable with a normal arm swing and speed. No ataxia, shuffling, steppage or waddling was noted. Tandem gait was intact. Postural reflexes were normal.     Assessment/Plan   The patient is still having fairly severe back pain and myalgias.  The patient needs to follow-up with the pain management consult that was put in by rheumatology.  The patient should continue her Cymbalta and Lyrica.  The patient continue symptomatic pain medicines for severe back pain.  The patient is also still complaining of episodes of dizziness.  The patient needs a bone scan, echocardiogram, ENG, EEG and autonomic testing.  The patient needs to stay well-hydrated.  The patient should also follow-up with her primary care doctor for the symptoms.  The patient should continue all of her medicines.  The patient does need to follow-up with her endocrinologist to rule out the possibility of a thyroid cancer recurrence.  I discussed all these issues in detail with the patient and answered all her questions.  The patient will follow up with me in 6 months

## 2024-02-29 NOTE — PATIENT INSTRUCTIONS
The patient is still having fairly severe back pain and myalgias.  The patient needs to follow-up with the pain management consult that was put in by rheumatology.  The patient should continue her Cymbalta and Lyrica.  The patient continue symptomatic pain medicines for severe back pain.  The patient is also still complaining of episodes of dizziness.  The patient needs a bone scan, echocardiogram, ENG, EEG and autonomic testing.  The patient needs to stay well-hydrated.  The patient should also follow-up with her primary care doctor for the symptoms.  The patient should continue all of her medicines.  The patient does need to follow-up with her endocrinologist to rule out the possibility of a thyroid cancer recurrence.  I discussed all these issues in detail with the patient and answered all her questions.  The patient will follow up with me in 6 months

## 2024-03-01 DIAGNOSIS — R79.89 ABNORMAL CBC: Primary | ICD-10-CM

## 2024-03-01 NOTE — RESULT ENCOUNTER NOTE
Cholesterol and ldl coming down.continue diet chages  Platelets slightly high.  Not sure of reason  Please repeat a cb in 2 weeks after drinking fluids,not fasting  Order is in chart

## 2024-03-04 ENCOUNTER — APPOINTMENT (OUTPATIENT)
Dept: NEUROLOGY | Facility: CLINIC | Age: 49
End: 2024-03-04
Payer: COMMERCIAL

## 2024-03-04 ENCOUNTER — HOSPITAL ENCOUNTER (OUTPATIENT)
Dept: CARDIOLOGY | Facility: HOSPITAL | Age: 49
Discharge: HOME | End: 2024-03-04
Payer: COMMERCIAL

## 2024-03-04 DIAGNOSIS — R42 DIZZINESS: ICD-10-CM

## 2024-03-04 DIAGNOSIS — R55 SYNCOPE, UNSPECIFIED SYNCOPE TYPE: ICD-10-CM

## 2024-03-04 PROCEDURE — 93005 ELECTROCARDIOGRAM TRACING: CPT

## 2024-03-06 LAB
ATRIAL RATE: 84 BPM
P AXIS: 48 DEGREES
P OFFSET: 206 MS
P ONSET: 154 MS
PR INTERVAL: 138 MS
Q ONSET: 223 MS
QRS COUNT: 14 BEATS
QRS DURATION: 70 MS
QT INTERVAL: 374 MS
QTC CALCULATION(BAZETT): 441 MS
QTC FREDERICIA: 418 MS
R AXIS: 7 DEGREES
T AXIS: 41 DEGREES
T OFFSET: 410 MS
VENTRICULAR RATE: 84 BPM

## 2024-03-12 NOTE — TELEPHONE ENCOUNTER
Patient has concerns about her Trulicity because her blood sugar has been between  but she knows the goal she was aiming for was 140. She wants to know if she should still be taking the medication, or lower the dose, if she needs a different one, or if this is normal when starting Trulicity. Please advise.    143

## 2024-03-18 DIAGNOSIS — J45.20 MILD INTERMITTENT ASTHMA WITHOUT COMPLICATION (HHS-HCC): Primary | ICD-10-CM

## 2024-03-18 DIAGNOSIS — L21.9 SEBORRHEIC DERMATITIS, UNSPECIFIED: ICD-10-CM

## 2024-03-18 RX ORDER — FOLIC ACID 1 MG/1
1 TABLET ORAL DAILY
Qty: 30 TABLET | Refills: 0 | Status: SHIPPED | OUTPATIENT
Start: 2024-03-18 | End: 2024-04-17

## 2024-03-21 RX ORDER — ALBUTEROL SULFATE 90 UG/1
2 AEROSOL, METERED RESPIRATORY (INHALATION) 3 TIMES DAILY PRN
Qty: 18 G | Refills: 0 | Status: SHIPPED | OUTPATIENT
Start: 2024-03-21

## 2024-03-25 ENCOUNTER — LAB (OUTPATIENT)
Dept: LAB | Facility: LAB | Age: 49
End: 2024-03-25
Payer: COMMERCIAL

## 2024-03-25 DIAGNOSIS — R79.89 ABNORMAL CBC: ICD-10-CM

## 2024-03-25 LAB
BASOPHILS # BLD AUTO: 0.06 X10*3/UL (ref 0–0.1)
BASOPHILS NFR BLD AUTO: 0.8 %
EOSINOPHIL # BLD AUTO: 0.29 X10*3/UL (ref 0–0.7)
EOSINOPHIL NFR BLD AUTO: 3.8 %
ERYTHROCYTE [DISTWIDTH] IN BLOOD BY AUTOMATED COUNT: 15 % (ref 11.5–14.5)
HCT VFR BLD AUTO: 39.9 % (ref 36–46)
HGB BLD-MCNC: 12.8 G/DL (ref 12–16)
IMM GRANULOCYTES # BLD AUTO: 0.02 X10*3/UL (ref 0–0.7)
IMM GRANULOCYTES NFR BLD AUTO: 0.3 % (ref 0–0.9)
LYMPHOCYTES # BLD AUTO: 2.19 X10*3/UL (ref 1.2–4.8)
LYMPHOCYTES NFR BLD AUTO: 28.4 %
MCH RBC QN AUTO: 26.8 PG (ref 26–34)
MCHC RBC AUTO-ENTMCNC: 32.1 G/DL (ref 32–36)
MCV RBC AUTO: 84 FL (ref 80–100)
MONOCYTES # BLD AUTO: 0.5 X10*3/UL (ref 0.1–1)
MONOCYTES NFR BLD AUTO: 6.5 %
NEUTROPHILS # BLD AUTO: 4.64 X10*3/UL (ref 1.2–7.7)
NEUTROPHILS NFR BLD AUTO: 60.2 %
NRBC BLD-RTO: 0 /100 WBCS (ref 0–0)
PLATELET # BLD AUTO: 423 X10*3/UL (ref 150–450)
RBC # BLD AUTO: 4.78 X10*6/UL (ref 4–5.2)
WBC # BLD AUTO: 7.7 X10*3/UL (ref 4.4–11.3)

## 2024-03-25 PROCEDURE — 85025 COMPLETE CBC W/AUTO DIFF WBC: CPT

## 2024-03-25 PROCEDURE — 36415 COLL VENOUS BLD VENIPUNCTURE: CPT

## 2024-03-27 NOTE — TELEPHONE ENCOUNTER
----- Message from Nicole Reza MD sent at 3/25/2024 12:39 PM EDT -----  Advice labs are normal.same rx plan.

## 2024-03-28 ENCOUNTER — OFFICE VISIT (OUTPATIENT)
Dept: SURGERY | Facility: CLINIC | Age: 49
End: 2024-03-28
Payer: COMMERCIAL

## 2024-03-28 VITALS
DIASTOLIC BLOOD PRESSURE: 86 MMHG | SYSTOLIC BLOOD PRESSURE: 122 MMHG | BODY MASS INDEX: 36.1 KG/M2 | WEIGHT: 230 LBS | HEIGHT: 67 IN | HEART RATE: 88 BPM

## 2024-03-28 DIAGNOSIS — Z85.850 HISTORY OF THYROID CANCER: Primary | ICD-10-CM

## 2024-03-28 PROCEDURE — 3079F DIAST BP 80-89 MM HG: CPT | Performed by: SURGERY

## 2024-03-28 PROCEDURE — 99214 OFFICE O/P EST MOD 30 MIN: CPT | Performed by: SURGERY

## 2024-03-28 PROCEDURE — 1036F TOBACCO NON-USER: CPT | Performed by: SURGERY

## 2024-03-28 PROCEDURE — 3074F SYST BP LT 130 MM HG: CPT | Performed by: SURGERY

## 2024-03-28 PROCEDURE — 3050F LDL-C >= 130 MG/DL: CPT | Performed by: SURGERY

## 2024-03-28 PROCEDURE — 99204 OFFICE O/P NEW MOD 45 MIN: CPT | Performed by: SURGERY

## 2024-03-28 NOTE — PROGRESS NOTES
Subjective   Patient ID: Cydney Fox is a 48 y.o. female who presents for follow-up with history of thyroid cancer.    HPI I saw Mrs. Fox back in surgery clinic today.  I originally took her to surgery for a total thyroidectomy in December 2015 for papillary thyroid cancer.  In 2017 she developed a recurrence in her left lateral neck lymph node compartment requiring a left modified radical neck lymph node dissection.  2 of 27 nodes had cancer present.  These were initially discovered with rising thyroglobulin levels.    My last visit with her was in February 2019.  She had a Thyrogen stimulated uptake scan with her endocrinologist which was negative and an undetectable thyroglobulin with a suppressed TSH of 0.2 at that time.  I have not seen her since then.  And it looks like her last office visit with anyone from endocrinology was September 2022.  At that time her thyroglobulin was 0.1 with a TSH suppressed at 0.06.    She is now following with Dr. Arredondo of medical endocrinology at [facility].  She does have an updated thyroglobulin level which per the patient's report was 0.1.  I do not have the TSH associated with that.  She got a thyroid ultrasound done January 2024 which showed some small 3 to 7 mm nodules in the left thyroid bed that radiology felt were most likely lymph nodes.  Nothing on the right side.  No abnormal cervical lymphadenopathy.  This was followed up with a neck CT scan.  It actually demonstrated a 3 mm nodule in the left thyroid bed but not the 7 mm nodule that was previously seen.  Again no other abnormal cervical lymphadenopathy.    She denies any other changes in her neck.  No real new pain or pressure no difficulty with breathing or swallowing.  She is always had some chronic vocal changes.  These were pre-existing.  Apparently she had a nerve stimulator in her right neck for chronic pain syndrome problems.  This is now been removed.    Review of Systems    Objective    Physical Exam  Vitals reviewed.   Constitutional:       Appearance: Normal appearance.   Eyes:      Comments: No proptosis   Neck:      Vascular: No carotid bruit.      Comments: Thyroid is surgically absent.  Trachea midline.  No palpable neck masses.  Surgical incision with thyroidectomy and left modified radical neck lymph node incisions look good.  Cardiovascular:      Rate and Rhythm: Normal rate and regular rhythm.      Pulses: Normal pulses.      Heart sounds: Normal heart sounds.   Pulmonary:      Effort: Pulmonary effort is normal. No respiratory distress.      Breath sounds: Normal breath sounds. No wheezing or rales.   Musculoskeletal:         General: Normal range of motion.   Lymphadenopathy:      Cervical: No cervical adenopathy.   Skin:     General: Skin is dry.   Neurological:      General: No focal deficit present.      Mental Status: She is alert and oriented to person, place, and time.   Psychiatric:         Mood and Affect: Mood normal.         Behavior: Behavior normal.     I reviewed reports of the patient's thyroid ultrasound from January 5, 2024 at [facility] and CT scan of the neck March 6, 2024 at [facility].    Assessment/Plan    Patient has a known history of papillary thyroid cancer and is now status post total thyroidectomy and left modified radical neck lymph node dissection.  Last visit with me was back in 2019 and last  endocrinology visit was 2022.  She is now following with medical endocrinology at [facility].    Overall, she is doing fairly well.  Her thyroglobulin levels are stable at 0.1 which was the same level she had at  in 2022.  Recent ultrasound and CT scan at [facility] showed some small subcentimeter areas in the left thyroid bed which could represent lymph nodes.  I only have written reports that the patient brought with her.  We will contact [facility] to have them push her imaging over so I  can personally review that.    We did have a long discussion that given the fact her thyroglobulin levels are stable that this does not likely reflect active thyroid cancer.    The small areas in the left neck should undergo routine surveillance.  Therefore did order a 6-month follow-up ultrasound for her.    She can continue following with her medical endocrinologist for management of thyroid hormone replacement and routine thyroglobulin testing.           Anderson Tomas MD 03/28/24 2:31 PM

## 2024-04-17 DIAGNOSIS — L21.9 SEBORRHEIC DERMATITIS, UNSPECIFIED: ICD-10-CM

## 2024-04-17 RX ORDER — FOLIC ACID 1 MG/1
1 TABLET ORAL DAILY
Qty: 90 TABLET | Refills: 0 | Status: SHIPPED | OUTPATIENT
Start: 2024-04-17 | End: 2024-07-16

## 2024-04-29 DIAGNOSIS — E55.9 VITAMIN D DEFICIENCY, UNSPECIFIED: ICD-10-CM

## 2024-04-29 RX ORDER — ERGOCALCIFEROL 1.25 MG/1
1 CAPSULE ORAL
Qty: 2 CAPSULE | Refills: 11 | Status: SHIPPED | OUTPATIENT
Start: 2024-04-29 | End: 2024-06-03 | Stop reason: SINTOL

## 2024-05-29 ENCOUNTER — TELEPHONE (OUTPATIENT)
Dept: SURGERY | Facility: CLINIC | Age: 49
End: 2024-05-29
Payer: COMMERCIAL

## 2024-05-29 NOTE — TELEPHONE ENCOUNTER
Called patient to notify her that [facility] has sent over thyroid US for review but denies have record of neck CT dated 3/5/24. Patient verifies that CT was definitely done at [facility] but will double check the date of service and get back to me. Callback number provided.

## 2024-06-03 DIAGNOSIS — E55.9 VITAMIN D DEFICIENCY: Primary | ICD-10-CM

## 2024-06-03 RX ORDER — CHOLECALCIFEROL (VITAMIN D3) 50 MCG
50 TABLET ORAL DAILY
Qty: 30 TABLET | Refills: 11 | Status: SHIPPED | OUTPATIENT
Start: 2024-06-03 | End: 2025-06-03

## 2024-06-20 ENCOUNTER — OFFICE VISIT (OUTPATIENT)
Dept: PRIMARY CARE | Facility: CLINIC | Age: 49
End: 2024-06-20
Payer: COMMERCIAL

## 2024-06-20 VITALS
WEIGHT: 228.5 LBS | BODY MASS INDEX: 35.87 KG/M2 | HEART RATE: 92 BPM | HEIGHT: 67 IN | OXYGEN SATURATION: 96 % | SYSTOLIC BLOOD PRESSURE: 110 MMHG | DIASTOLIC BLOOD PRESSURE: 76 MMHG

## 2024-06-20 DIAGNOSIS — M77.8 TENDONITIS OF BOTH WRISTS: Primary | ICD-10-CM

## 2024-06-20 PROCEDURE — 99213 OFFICE O/P EST LOW 20 MIN: CPT | Performed by: INTERNAL MEDICINE

## 2024-06-20 PROCEDURE — 3074F SYST BP LT 130 MM HG: CPT | Performed by: INTERNAL MEDICINE

## 2024-06-20 PROCEDURE — 1036F TOBACCO NON-USER: CPT | Performed by: INTERNAL MEDICINE

## 2024-06-20 PROCEDURE — 3078F DIAST BP <80 MM HG: CPT | Performed by: INTERNAL MEDICINE

## 2024-06-20 PROCEDURE — 3050F LDL-C >= 130 MG/DL: CPT | Performed by: INTERNAL MEDICINE

## 2024-06-20 RX ORDER — PREDNISONE 20 MG/1
20 TABLET ORAL DAILY
Qty: 5 TABLET | Refills: 0 | Status: SHIPPED | OUTPATIENT
Start: 2024-06-20 | End: 2024-06-25

## 2024-06-20 ASSESSMENT — PATIENT HEALTH QUESTIONNAIRE - PHQ9
10. IF YOU CHECKED OFF ANY PROBLEMS, HOW DIFFICULT HAVE THESE PROBLEMS MADE IT FOR YOU TO DO YOUR WORK, TAKE CARE OF THINGS AT HOME, OR GET ALONG WITH OTHER PEOPLE: SOMEWHAT DIFFICULT
2. FEELING DOWN, DEPRESSED OR HOPELESS: SEVERAL DAYS
1. LITTLE INTEREST OR PLEASURE IN DOING THINGS: SEVERAL DAYS
SUM OF ALL RESPONSES TO PHQ9 QUESTIONS 1 AND 2: 2

## 2024-06-20 NOTE — PROGRESS NOTES
"Subjective   Patient ID: Cydney Fox is a 48 y.o. female who presents for Follow-up (Pt has been having a ongoing problem with tendinitis in her hands and wrists for months. ).    HPI   Has tendon pain b/l from thumb to elbow.seen hand specialist.Tried Ptwhich made it worse  Wearing brace  Pain is intense  Pain is felt even at rest  No numbness or tingling  Use phone a lot  Has fibromyalgia and long  COVID  Seeing pain management  Had nerve block for neck pain.  Recommended ketamine.  She is still thinking about it  Cannot tolerate higher dose of Lyrica  Blood sugar is controlled  Review of Systems  No fever or chills  No nausea vomiting diarrhea    Objective   /76   Pulse 92   Ht 1.702 m (5' 7\")   Wt 104 kg (228 lb 8 oz)   SpO2 96%   BMI 35.79 kg/m²     Physical Exam  Looks tired.  Not in any distress  Chest is clear  CVs: S1-S2 regular rate and rhythm  Examination of both hands reveals sensitivity to touch lower arms.  Tenderness forearm muscles and extensor pollicis longus on both sides at wrist level   is okay  Assessment/Plan   Problem List Items Addressed This Visit             ICD-10-CM    Tendonitis of both wrists - Primary M77.8    Relevant Medications    predniSONE (Deltasone) 20 mg tablet     Patient with fibromyalgia and long COVID and chronic pain with severe tendinitis and pain of both wrists.  Physical therapy aggravated her pain.  She has already seen hand specialist  Cannot tolerate more Lyrica  Under care of pain management  Recommended to apply ice or heat and try to do gentle massage.  Avoid excessive  use of fingers like scrolling on the phone  Will give short course of prednisone.  Monitor blood sugar closely     "

## 2024-06-21 DIAGNOSIS — J45.20 MILD INTERMITTENT ASTHMA WITHOUT COMPLICATION (HHS-HCC): ICD-10-CM

## 2024-06-21 DIAGNOSIS — L21.9 SEBORRHEIC DERMATITIS, UNSPECIFIED: ICD-10-CM

## 2024-06-21 RX ORDER — ALBUTEROL SULFATE 90 UG/1
2 AEROSOL, METERED RESPIRATORY (INHALATION) 3 TIMES DAILY PRN
Qty: 8 G | Refills: 1 | Status: SHIPPED | OUTPATIENT
Start: 2024-06-21

## 2024-06-21 RX ORDER — FOLIC ACID 1 MG/1
1 TABLET ORAL DAILY
Qty: 90 TABLET | Refills: 0 | Status: SHIPPED | OUTPATIENT
Start: 2024-06-21

## 2024-07-05 ENCOUNTER — APPOINTMENT (OUTPATIENT)
Dept: RADIOLOGY | Facility: HOSPITAL | Age: 49
End: 2024-07-05
Payer: COMMERCIAL

## 2024-07-15 ENCOUNTER — HOSPITAL ENCOUNTER (EMERGENCY)
Facility: HOSPITAL | Age: 49
Discharge: HOME | End: 2024-07-15
Attending: EMERGENCY MEDICINE
Payer: COMMERCIAL

## 2024-07-15 ENCOUNTER — APPOINTMENT (OUTPATIENT)
Dept: RADIOLOGY | Facility: HOSPITAL | Age: 49
End: 2024-07-15
Payer: COMMERCIAL

## 2024-07-15 VITALS
SYSTOLIC BLOOD PRESSURE: 130 MMHG | OXYGEN SATURATION: 96 % | WEIGHT: 235 LBS | TEMPERATURE: 98.2 F | BODY MASS INDEX: 36.81 KG/M2 | DIASTOLIC BLOOD PRESSURE: 69 MMHG | RESPIRATION RATE: 20 BRPM | HEART RATE: 82 BPM

## 2024-07-15 DIAGNOSIS — R51.9 NONINTRACTABLE HEADACHE, UNSPECIFIED CHRONICITY PATTERN, UNSPECIFIED HEADACHE TYPE: Primary | ICD-10-CM

## 2024-07-15 PROCEDURE — 70450 CT HEAD/BRAIN W/O DYE: CPT | Performed by: RADIOLOGY

## 2024-07-15 PROCEDURE — 99284 EMERGENCY DEPT VISIT MOD MDM: CPT | Mod: 25

## 2024-07-15 PROCEDURE — 70450 CT HEAD/BRAIN W/O DYE: CPT

## 2024-07-15 ASSESSMENT — COLUMBIA-SUICIDE SEVERITY RATING SCALE - C-SSRS
2. HAVE YOU ACTUALLY HAD ANY THOUGHTS OF KILLING YOURSELF?: NO
1. IN THE PAST MONTH, HAVE YOU WISHED YOU WERE DEAD OR WISHED YOU COULD GO TO SLEEP AND NOT WAKE UP?: NO
6. HAVE YOU EVER DONE ANYTHING, STARTED TO DO ANYTHING, OR PREPARED TO DO ANYTHING TO END YOUR LIFE?: NO

## 2024-07-15 ASSESSMENT — LIFESTYLE VARIABLES
TOTAL SCORE: 0
HAVE PEOPLE ANNOYED YOU BY CRITICIZING YOUR DRINKING: NO
HAVE YOU EVER FELT YOU SHOULD CUT DOWN ON YOUR DRINKING: NO
EVER FELT BAD OR GUILTY ABOUT YOUR DRINKING: NO
EVER HAD A DRINK FIRST THING IN THE MORNING TO STEADY YOUR NERVES TO GET RID OF A HANGOVER: NO

## 2024-07-15 NOTE — ED PROVIDER NOTES
HPI   Chief Complaint   Patient presents with    Headache    Epistaxis (Nose Bleed)     Pt arrives private auto from home. States nosebleed this afternoon @ 1700. States bleeding stopped after 10 minutes. Pt concerned since she fell out of bed 5 days ago and hit head on ground. No LOC and not on anticoagulants. Pt states she was not seen for fall days ago but has had headache since. Denies nausea/vomiting/vision changes.       48-year-old female who presents with headache and a bloody nose.  Patient states that she fell out of bed on Saturday she states she hit the right side of her body and her head.  She did not have any LOC.  She denies any neck or back pain.  She states she seemed fine afterwards she denies any nausea vomiting.  States today though she developed a bloody nose on the right side of her nostril.  She is not on any blood thinners.  She states that blood for approximately 10 minutes and stopped on its own.  She was concerned and came in for evaluation.  She denies any other injuries.                          [place] Coma Scale Score: 15                     Patient History   Past Medical History:   Diagnosis Date    Abdominal distension (gaseous) 02/16/2016    Abdominal bloating    Abnormal levels of other serum enzymes 02/16/2016    Elevated liver enzymes    Acute pharyngitis, unspecified 12/26/2014    Sore throat    Acute upper respiratory infection, unspecified 03/20/2018    Acute URI    Acute upper respiratory infection, unspecified 04/12/2017    Acute URI    Calculus of gallbladder without cholecystitis without obstruction 03/29/2016    Cholelithiasis without obstruction    Chondrocostal junction syndrome (tietze)     Costochondritis    Depression, unspecified 07/01/2022    Depression    Disease of gallbladder, unspecified     Gallbladder problem    Impacted cerumen, left ear 07/16/2019    Impacted cerumen of left ear    Localized enlarged lymph nodes 11/15/2017    Cervical lymphadenopathy     Nasal congestion     Nasal congestion    Noninfective gastroenteritis and colitis, unspecified 04/08/2016    Gastroenteritis, acute    Other amnesia     Memory problem    Other diseases of pharynx     Irritated throat    Otorrhea, unspecified ear     Ear drainage    Pelvic and perineal pain 06/30/2016    Adnexal tenderness    Personal history of diseases of the blood and blood-forming organs and certain disorders involving the immune mechanism 02/05/2014    History of leukocytosis    Personal history of malignant neoplasm of thyroid     History of malignant neoplasm of thyroid    Personal history of other diseases of the circulatory system     History of mitral valve prolapse    Personal history of other diseases of the digestive system 08/16/2021    History of gastritis    Personal history of other diseases of the digestive system 02/05/2014    History of gastritis    Personal history of other diseases of the female genital tract 06/28/2016    History of irregular menstrual cycles    Personal history of other diseases of the musculoskeletal system and connective tissue     History of arthritis    Personal history of other diseases of the musculoskeletal system and connective tissue     Personal history of fibromyalgia    Personal history of other diseases of the respiratory system 03/29/2016    History of acute bronchitis    Personal history of other diseases of the respiratory system     Personal history of asthma    Personal history of other specified conditions     History of headache    Personal history of other specified conditions     History of shortness of breath    Personal history of other specified conditions 02/24/2016    History of nausea    Personal history of other specified conditions     History of wheezing    Personal history of other specified conditions 02/22/2016    History of abdominal pain    Sneezing     Sneezing    Unspecified asthma with (acute) exacerbation ([facility]) 03/28/2016    Asthma  exacerbation    Unspecified epiphora, bilateral     Watery eyes     Past Surgical History:   Procedure Laterality Date    CHOLECYSTECTOMY  03/28/2016    Cholecystectomy    MR HEAD ANGIO WO IV CONTRAST  9/16/2022    MR HEAD ANGIO WO IV CONTRAST 9/16/2022 [facility]    MR NECK ANGIO WO IV CONTRAST  9/16/2022    MR NECK ANGIO WO IV CONTRAST 9/16/2022 [facility]    OTHER SURGICAL HISTORY  09/16/2021    Colonoscopy    TOTAL THYROIDECTOMY  12/15/2015    Thyroid Surgery Total Thyroidectomy     Family History   Problem Relation Name Age of Onset    Thyroid cancer Mother      Fibromyalgia Mother      Hypothyroidism Mother      Diabetes Father      Hyperlipidemia Father      Hypertension Father      Lung cancer Maternal Grandmother      Other (partial thyroidectomy) Paternal Grandmother      Breast cancer Father's Sister  65    Allergies Other Family History     Hypertension Other Family History     Hearing loss Other Family History      Social History     Tobacco Use    Smoking status: Never     Passive exposure: Never    Smokeless tobacco: Never   Substance Use Topics    Alcohol use: Not Currently    Drug use: Never       Physical Exam   ED Triage Vitals [07/15/24 1727]   Temperature Heart Rate Respirations BP   36.8 °C (98.2 °F) 97 20 170/67      Pulse Ox Temp src Heart Rate Source Patient Position   97 % -- -- --      BP Location FiO2 (%)     -- --       Physical Exam  Constitutional:       Appearance: Normal appearance. She is normal weight.   HENT:      Head: Normocephalic and atraumatic.      Nose: Nose normal.      Mouth/Throat:      Mouth: Mucous membranes are moist.      Pharynx: Oropharynx is clear.   Eyes:      Extraocular Movements: Extraocular movements intact.      Conjunctiva/sclera: Conjunctivae normal.      Pupils: Pupils are equal, round, and reactive to light.   Cardiovascular:      Rate and Rhythm: Normal rate and regular rhythm.   Pulmonary:      Effort: Pulmonary effort is normal.      Breath  sounds: Normal breath sounds.   Abdominal:      General: Abdomen is flat. Bowel sounds are normal.      Palpations: Abdomen is soft.   Musculoskeletal:         General: Normal range of motion.      Cervical back: Normal range of motion and neck supple.   Skin:     General: Skin is warm and dry.      Capillary Refill: Capillary refill takes less than 2 seconds.   Neurological:      General: No focal deficit present.      Mental Status: She is alert.   Psychiatric:         Mood and Affect: Mood normal.         Behavior: Behavior normal.         Thought Content: Thought content normal.         Judgment: Judgment normal.       CT head wo IV contrast   Final Result   No evidence of acute cortical infarct or intracranial hemorrhage.        Unchanged presumed meningioma in the parasagittal region        MACRO:   None        Signed by: Todd Young 7/15/2024 6:57 PM   Dictation workstation:   [id number]            ED Course & MDM   Diagnoses as of 07/15/24 1928   Nonintractable headache, unspecified chronicity pattern, unspecified headache type       Medical Decision Making  Emergency department course, CT of the head was obtained which showed no evidence of acute cortical infarct or hemorrhage.  Patient was given results of these findings.  I feel comfortable discharging patient home.  She is instructed to take Tylenol or Motrin for her head pain.  Patient is also instructed to follow-up with her primary care physician.  Patient is agreeable with this plan.        Procedure  Procedures     Denisse Robles DO  07/15/24 1928

## 2024-07-16 ENCOUNTER — TELEPHONE (OUTPATIENT)
Dept: PRIMARY CARE | Facility: CLINIC | Age: 49
End: 2024-07-16
Payer: COMMERCIAL

## 2024-07-16 NOTE — TELEPHONE ENCOUNTER
If patient does want an appointment am I scheduling first available or using a same day time slot ?

## 2024-07-16 NOTE — TELEPHONE ENCOUNTER
----- Message from Nicole Reza sent at 7/15/2024  8:08 PM EDT -----  Regarding: FW: fall  I think she went to ER.Schedule if she has further concerns  ----- Message -----  From: Radha Sherman MD  Sent: 7/14/2024   2:33 PM EDT  To: Nicole Reza MD  Subject: fall                                             Patient called answering service 7/14/24 at 11:51 AM.  States on Thursday, she fell and hit her head and arm.  Just some bumps and bruises, did not go to ER.   She has been concerned - states she sent several [employer] Messages and called today because she'd like an appointment with Dr. Reza.  She seemed somewhat surprised that I called her, as she just wanted an appointment.   She states she does not think she has a concussion - no vision changes, nausea, vomiting, etc.  She is not on blood thinners.  She sounded tearful on the phone - I advised that I think she should go to the ER for prompt evaluation.  She assured me that she would go if she develops any new or worsening symptoms.

## 2024-07-24 ENCOUNTER — OFFICE VISIT (OUTPATIENT)
Dept: PRIMARY CARE | Facility: CLINIC | Age: 49
End: 2024-07-24
Payer: COMMERCIAL

## 2024-07-24 ENCOUNTER — APPOINTMENT (OUTPATIENT)
Dept: PRIMARY CARE | Facility: CLINIC | Age: 49
End: 2024-07-24
Payer: COMMERCIAL

## 2024-07-24 VITALS
DIASTOLIC BLOOD PRESSURE: 81 MMHG | BODY MASS INDEX: 35.99 KG/M2 | WEIGHT: 229.3 LBS | OXYGEN SATURATION: 98 % | HEART RATE: 83 BPM | SYSTOLIC BLOOD PRESSURE: 114 MMHG | HEIGHT: 67 IN

## 2024-07-24 DIAGNOSIS — M62.838 NECK MUSCLE SPASM: ICD-10-CM

## 2024-07-24 DIAGNOSIS — G44.209 TENSION HEADACHE: ICD-10-CM
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
## 2024-07-24 PROCEDURE — 3050F LDL-C >= 130 MG/DL: CPT | Performed by: INTERNAL MEDICINE

## 2024-07-24 PROCEDURE — 3008F BODY MASS INDEX DOCD: CPT | Performed by: INTERNAL MEDICINE

## 2024-07-24 PROCEDURE — 1036F TOBACCO NON-USER: CPT | Performed by: INTERNAL MEDICINE

## 2024-07-24 PROCEDURE — 3074F SYST BP LT 130 MM HG: CPT | Performed by: INTERNAL MEDICINE

## 2024-07-24 PROCEDURE — 99213 OFFICE O/P EST LOW 20 MIN: CPT | Performed by: INTERNAL MEDICINE

## 2024-07-24 PROCEDURE — 3079F DIAST BP 80-89 MM HG: CPT | Performed by: INTERNAL MEDICINE

## 2024-07-24 RX ORDER — BUPROPION HYDROCHLORIDE 150 MG/1
150 TABLET ORAL
COMMUNITY
Start: 2024-07-23 | End: 2024-08-22

## 2024-07-24 RX ORDER — KETOROLAC TROMETHAMINE 10 MG/1
10 TABLET, FILM COATED ORAL EVERY 8 HOURS PRN
Qty: 12 TABLET | Refills: 0 | Status: SHIPPED | OUTPATIENT
Start: 2024-07-24 | End: 2024-07-28

## 2024-07-24 ASSESSMENT — ENCOUNTER SYMPTOMS
PALPITATIONS: 0
LIGHT-HEADEDNESS: 1
FACIAL SWELLING: 0
UNEXPECTED WEIGHT CHANGE: 0
FEVER: 0
DIFFICULTY URINATING: 0
WHEEZING: 0
SORE THROAT: 0
WEAKNESS: 0
HEADACHES: 1
SEIZURES: 0
TREMORS: 0
ABDOMINAL PAIN: 0
SINUS PAIN: 0
SPEECH DIFFICULTY: 0
COUGH: 0
NUMBNESS: 0
AGITATION: 0
BLOOD IN STOOL: 0
SHORTNESS OF BREATH: 0

## 2024-07-24 ASSESSMENT — PATIENT HEALTH QUESTIONNAIRE - PHQ9
1. LITTLE INTEREST OR PLEASURE IN DOING THINGS: SEVERAL DAYS
2. FEELING DOWN, DEPRESSED OR HOPELESS: SEVERAL DAYS
SUM OF ALL RESPONSES TO PHQ9 QUESTIONS 1 AND 2: 2
10. IF YOU CHECKED OFF ANY PROBLEMS, HOW DIFFICULT HAVE THESE PROBLEMS MADE IT FOR YOU TO DO YOUR WORK, TAKE CARE OF THINGS AT HOME, OR GET ALONG WITH OTHER PEOPLE: SOMEWHAT DIFFICULT

## 2024-07-26 ENCOUNTER — APPOINTMENT (OUTPATIENT)
Dept: GASTROENTEROLOGY | Facility: CLINIC | Age: 49
End: 2024-07-26
Payer: COMMERCIAL

## 2024-08-29 ENCOUNTER — HOSPITAL ENCOUNTER (OUTPATIENT)
Dept: RADIOLOGY | Facility: HOSPITAL | Age: 49
Discharge: HOME | End: 2024-08-29
Payer: COMMERCIAL

## 2024-08-29 DIAGNOSIS — Z85.850 HISTORY OF THYROID CANCER: ICD-10-CM

## 2024-08-29 PROCEDURE — 76536 US EXAM OF HEAD AND NECK: CPT | Performed by: RADIOLOGY

## 2024-08-29 PROCEDURE — 76536 US EXAM OF HEAD AND NECK: CPT

## 2024-09-03 ENCOUNTER — TELEPHONE (OUTPATIENT)
Dept: SURGICAL ONCOLOGY | Facility: HOSPITAL | Age: 49
End: 2024-09-03
Payer: COMMERCIAL

## 2024-09-03 NOTE — TELEPHONE ENCOUNTER
Result Communication-I left the patient a voicemail that her ultrasound is stable.  These 2 tiny areas of tissue in her neck are stable with no significant growth or change.  No biopsies required.    Resulted Orders   US neck    Narrative    Interpreted By:  Diaz Morse,   STUDY:  US NECK  8/29/2024 1:12 pm      INDICATION:  47 y/o   F with  Signs/Symptoms:history of thyroid cancer.      ,Z85.850 Personal history of malignant neoplasm of thyroid      COMPARISON:  None.      ACCESSION NUMBER(S):  [id number]      ORDERING CLINICIAN:  DONN LYNN      TECHNIQUE:  Sonographic images in the neck bilaterally and thyroidectomy bed.  Static images were obtained for remote interpretation.      FINDINGS:  Status post thyroidectomy. Small amount of tissue in the left thyroid  fossa measuring 6 x 6 x 7 mm, previously 7 x 5 x 4 mm. Small  hypoechoic structure in the left side of thyroid fossa measuring 4 x  2 x 3 mm, previously 4 x 4 x 3 mm.      No lymphadenopathy on the images provided.        Impression    Small foci of probable residual thyroid tissue in the thyroidectomy  bed.      MACRO:  None      Signed by: Diaz Morse 8/30/2024 9:23 PM  Dictation workstation:   [id number]       3:53 PM      Results were successfully communicated with the patient and they did not acknowledge their understanding.

## 2024-09-05 ENCOUNTER — APPOINTMENT (OUTPATIENT)
Dept: PRIMARY CARE | Facility: CLINIC | Age: 49
End: 2024-09-05
Payer: COMMERCIAL

## 2024-09-05 VITALS
SYSTOLIC BLOOD PRESSURE: 111 MMHG | WEIGHT: 226.6 LBS | HEART RATE: 75 BPM | BODY MASS INDEX: 35.56 KG/M2 | HEIGHT: 67 IN | OXYGEN SATURATION: 97 % | DIASTOLIC BLOOD PRESSURE: 76 MMHG

## 2024-09-05 DIAGNOSIS — G89.29 CHRONIC PAIN OF LEFT KNEE: Primary | ICD-10-CM

## 2024-09-05 DIAGNOSIS — M25.562 CHRONIC PAIN OF LEFT KNEE: Primary | ICD-10-CM

## 2024-09-05 PROCEDURE — 3078F DIAST BP <80 MM HG: CPT | Performed by: INTERNAL MEDICINE

## 2024-09-05 PROCEDURE — 3008F BODY MASS INDEX DOCD: CPT | Performed by: INTERNAL MEDICINE

## 2024-09-05 PROCEDURE — 3050F LDL-C >= 130 MG/DL: CPT | Performed by: INTERNAL MEDICINE

## 2024-09-05 PROCEDURE — 1036F TOBACCO NON-USER: CPT | Performed by: INTERNAL MEDICINE

## 2024-09-05 PROCEDURE — 3074F SYST BP LT 130 MM HG: CPT | Performed by: INTERNAL MEDICINE

## 2024-09-05 PROCEDURE — 99213 OFFICE O/P EST LOW 20 MIN: CPT | Performed by: INTERNAL MEDICINE

## 2024-09-05 NOTE — PROGRESS NOTES
"Subjective   Patient ID: Cydney Fox is a 48 y.o. female who presents for Knee Pain (Been ongoing 9m. Left knee).    HPI   Has been having left knee pain for a while  Fell 2 months ago which aggravated the pain  Initially pain was when she was getting up or climbing steps  Now constant pain  Usual pain medications does not help her  Trying to be more active and changing diet  Headache is managed with neck massages and stretches    Review of Systems  No fever chills or night sweats  Blood sugar is controlled    Objective   /76   Pulse 75   Ht 1.702 m (5' 7\")   Wt 103 kg (226 lb 9.6 oz)   SpO2 97%   BMI 35.49 kg/m²     Physical Exam  In NAD  No pallor or icterus  Chest is clear  CVS: S1-S2 regular not tachycardic  Examination of left knee reveals tenderness over quadriceps insertion and bilateral joint line on left side.  No effusion.  Range of motion good  Slight pain with flexion  Aaron's test negative  No calf swelling or edema  Assessment/Plan   Problem List Items Addressed This Visit             ICD-10-CM    Chronic pain of left knee - Primary M25.562, G89.29     Likely quadriceps tendinitis.  Since chronic and worsening will get an x-ray  Apply heat and do gentle stretches  Can use topical pain creams like Voltaren and IcyHot  Start physical therapy  Continue healthy diet and exercise plan  Can try pool therapy     "

## 2024-09-09 ENCOUNTER — HOSPITAL ENCOUNTER (OUTPATIENT)
Dept: RADIOLOGY | Facility: HOSPITAL | Age: 49
Discharge: HOME | End: 2024-09-09
Payer: COMMERCIAL

## 2024-09-09 ENCOUNTER — APPOINTMENT (OUTPATIENT)
Dept: NEUROLOGY | Facility: CLINIC | Age: 49
End: 2024-09-09
Payer: COMMERCIAL

## 2024-09-09 DIAGNOSIS — G89.29 CHRONIC PAIN OF LEFT KNEE: ICD-10-CM

## 2024-09-09 DIAGNOSIS — M25.562 CHRONIC PAIN OF LEFT KNEE: ICD-10-CM

## 2024-09-09 PROCEDURE — 73562 X-RAY EXAM OF KNEE 3: CPT | Mod: LT

## 2024-09-09 PROCEDURE — 73562 X-RAY EXAM OF KNEE 3: CPT | Mod: LEFT SIDE | Performed by: RADIOLOGY

## 2024-09-14 DIAGNOSIS — L21.9 SEBORRHEIC DERMATITIS, UNSPECIFIED: ICD-10-CM

## 2024-09-16 RX ORDER — KETOCONAZOLE 20 MG/ML
SHAMPOO, SUSPENSION TOPICAL EVERY OTHER DAY
Qty: 120 ML | Refills: 0 | Status: SHIPPED | OUTPATIENT
Start: 2024-09-16

## 2024-09-24 ENCOUNTER — APPOINTMENT (OUTPATIENT)
Dept: PHYSICAL THERAPY | Facility: CLINIC | Age: 49
End: 2024-09-24
Payer: COMMERCIAL

## 2024-10-02 DIAGNOSIS — J45.30 MILD PERSISTENT ASTHMA WITHOUT COMPLICATION (HHS-HCC): Primary | ICD-10-CM

## 2024-10-02 RX ORDER — MONTELUKAST SODIUM 10 MG/1
10 TABLET ORAL DAILY
Qty: 90 TABLET | Refills: 3 | Status: SHIPPED | OUTPATIENT
Start: 2024-10-02

## 2024-10-08 DIAGNOSIS — F32.A ANXIETY AND DEPRESSION: Primary | ICD-10-CM

## 2024-10-08 DIAGNOSIS — F41.9 ANXIETY AND DEPRESSION: Primary | ICD-10-CM

## 2024-10-08 RX ORDER — CLONAZEPAM 0.5 MG/1
0.5 TABLET ORAL 3 TIMES DAILY
Qty: 21 TABLET | Refills: 0 | Status: SHIPPED | OUTPATIENT
Start: 2024-10-08 | End: 2024-10-15

## 2024-10-21 DIAGNOSIS — M79.7 FIBROMYALGIA: Primary | ICD-10-CM

## 2024-10-21 RX ORDER — PREGABALIN 50 MG/1
50 CAPSULE ORAL DAILY
Qty: 30 CAPSULE | Refills: 0 | Status: SHIPPED | OUTPATIENT
Start: 2024-10-21 | End: 2024-11-20

## 2024-10-24 ENCOUNTER — OFFICE VISIT (OUTPATIENT)
Dept: PRIMARY CARE | Facility: CLINIC | Age: 49
End: 2024-10-24
Payer: COMMERCIAL

## 2024-10-24 VITALS
OXYGEN SATURATION: 98 % | DIASTOLIC BLOOD PRESSURE: 77 MMHG | SYSTOLIC BLOOD PRESSURE: 128 MMHG | WEIGHT: 230.9 LBS | HEIGHT: 67 IN | HEART RATE: 86 BPM | BODY MASS INDEX: 36.24 KG/M2

## 2024-10-24 DIAGNOSIS — E11.65 TYPE 2 DIABETES MELLITUS WITH HYPERGLYCEMIA, WITHOUT LONG-TERM CURRENT USE OF INSULIN: ICD-10-CM

## 2024-10-24 DIAGNOSIS — M79.7 FIBROMYALGIA: Primary | ICD-10-CM

## 2024-10-24 DIAGNOSIS — G89.29 CHRONIC PAIN OF LEFT KNEE: ICD-10-CM

## 2024-10-24 DIAGNOSIS — M25.562 CHRONIC PAIN OF LEFT KNEE: ICD-10-CM

## 2024-10-24 PROCEDURE — 3008F BODY MASS INDEX DOCD: CPT | Performed by: INTERNAL MEDICINE

## 2024-10-24 PROCEDURE — 3078F DIAST BP <80 MM HG: CPT | Performed by: INTERNAL MEDICINE

## 2024-10-24 PROCEDURE — 1036F TOBACCO NON-USER: CPT | Performed by: INTERNAL MEDICINE

## 2024-10-24 PROCEDURE — 99213 OFFICE O/P EST LOW 20 MIN: CPT | Performed by: INTERNAL MEDICINE

## 2024-10-24 PROCEDURE — 3050F LDL-C >= 130 MG/DL: CPT | Performed by: INTERNAL MEDICINE

## 2024-10-24 PROCEDURE — 3074F SYST BP LT 130 MM HG: CPT | Performed by: INTERNAL MEDICINE

## 2024-10-24 RX ORDER — NATEGLINIDE 60 MG/1
60 TABLET ORAL DAILY
Qty: 30 TABLET | Refills: 0 | Status: SHIPPED | OUTPATIENT
Start: 2024-10-24 | End: 2024-11-23

## 2024-10-24 RX ORDER — LEVOTHYROXINE SODIUM 100 UG/1
100 TABLET ORAL DAILY
COMMUNITY

## 2024-10-24 ASSESSMENT — PATIENT HEALTH QUESTIONNAIRE - PHQ9
1. LITTLE INTEREST OR PLEASURE IN DOING THINGS: SEVERAL DAYS
10. IF YOU CHECKED OFF ANY PROBLEMS, HOW DIFFICULT HAVE THESE PROBLEMS MADE IT FOR YOU TO DO YOUR WORK, TAKE CARE OF THINGS AT HOME, OR GET ALONG WITH OTHER PEOPLE: SOMEWHAT DIFFICULT
2. FEELING DOWN, DEPRESSED OR HOPELESS: SEVERAL DAYS
SUM OF ALL RESPONSES TO PHQ9 QUESTIONS 1 AND 2: 2

## 2024-10-24 ASSESSMENT — ENCOUNTER SYMPTOMS
COUGH: 0
FEVER: 0
UNEXPECTED WEIGHT CHANGE: 0
WHEEZING: 0
SHORTNESS OF BREATH: 0
BLOOD IN STOOL: 0
APPETITE CHANGE: 0
SINUS PAIN: 0

## 2024-10-24 NOTE — PROGRESS NOTES
"Subjective   Patient ID: Cydney Fox is a 49 y.o. female who presents for Follow-up (Pt here to discuss Dr. Reza taking over a refill on medication. Also would like to discuss the rehab for her knee. ).    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   HENT:  Negative for sinus pain.    Respiratory:  Negative for cough, shortness of breath and wheezing.    Cardiovascular:  Negative for chest pain.   Gastrointestinal:  Negative for blood in stool.       Objective   /77   Pulse 86   Ht 1.702 m (5' 7\")   Wt 105 kg (230 lb 14.4 oz)   SpO2 98%   BMI 36.16 kg/m²     Physical Exam  In NAD  No pallor or icterus  Chest is clear  CVS: S1-S2 regular not tachycardic  Examination of left knee reveals tenderness over quadriceps insertion and bilateral joint line on left side.  No effusion.  Range of motion good  Slight pain with flexion  Mood and affect nl  Assessment/Plan   Problem List Items Addressed This Visit             ICD-10-CM    Type 2 diabetes mellitus with hyperglycemia, without long-term current use of insulin E11.65    Relevant Medications    nateglinide (Starlix) 60 mg tablet    Fibromyalgia - Primary M79.7    Chronic pain of left knee M25.562, G89.29    Relevant Orders    Disability Placard     Lyrica rx given  Csa signed  Temporary disability sticker  TLC to treat IBS     "

## 2024-10-30 ENCOUNTER — EVALUATION (OUTPATIENT)
Dept: PHYSICAL THERAPY | Facility: CLINIC | Age: 49
End: 2024-10-30
Payer: COMMERCIAL

## 2024-10-30 ENCOUNTER — APPOINTMENT (OUTPATIENT)
Dept: PRIMARY CARE | Facility: CLINIC | Age: 49
End: 2024-10-30
Payer: COMMERCIAL

## 2024-10-30 DIAGNOSIS — G89.29 CHRONIC PAIN OF LEFT KNEE: ICD-10-CM

## 2024-10-30 DIAGNOSIS — M25.562 CHRONIC PAIN OF LEFT KNEE: ICD-10-CM

## 2024-10-30 PROCEDURE — 97161 PT EVAL LOW COMPLEX 20 MIN: CPT | Mod: GP

## 2024-10-30 PROCEDURE — 97110 THERAPEUTIC EXERCISES: CPT | Mod: GP

## 2024-10-30 ASSESSMENT — PATIENT HEALTH QUESTIONNAIRE - PHQ9
SUM OF ALL RESPONSES TO PHQ9 QUESTIONS 1 AND 2: 0
1. LITTLE INTEREST OR PLEASURE IN DOING THINGS: NOT AT ALL
2. FEELING DOWN, DEPRESSED OR HOPELESS: NOT AT ALL

## 2024-10-30 ASSESSMENT — COLUMBIA-SUICIDE SEVERITY RATING SCALE - C-SSRS
1. IN THE PAST MONTH, HAVE YOU WISHED YOU WERE DEAD OR WISHED YOU COULD GO TO SLEEP AND NOT WAKE UP?: NO
2. HAVE YOU ACTUALLY HAD ANY THOUGHTS OF KILLING YOURSELF?: NO
6. HAVE YOU EVER DONE ANYTHING, STARTED TO DO ANYTHING, OR PREPARED TO DO ANYTHING TO END YOUR LIFE?: NO

## 2024-11-06 ENCOUNTER — DOCUMENTATION (OUTPATIENT)
Dept: PHYSICAL THERAPY | Facility: CLINIC | Age: 49
End: 2024-11-06
Payer: COMMERCIAL

## 2024-11-06 ENCOUNTER — APPOINTMENT (OUTPATIENT)
Dept: PHYSICAL THERAPY | Facility: CLINIC | Age: 49
End: 2024-11-06
Payer: COMMERCIAL

## 2024-11-06 NOTE — PROGRESS NOTES
Physical Therapy                 Therapy Communication Note    Patient Name: Cydney Fox  MRN: [id number]  Department:   Room: Room/bed info not found  Today's Date: 11/6/2024     Discipline: Physical Therapy    Missed Visit Reason:      Missed Time: Cancel    Comment: pain

## 2024-11-11 ENCOUNTER — TREATMENT (OUTPATIENT)
Dept: PHYSICAL THERAPY | Facility: CLINIC | Age: 49
End: 2024-11-11
Payer: COMMERCIAL

## 2024-11-11 DIAGNOSIS — M25.562 CHRONIC PAIN OF LEFT KNEE: ICD-10-CM

## 2024-11-11 DIAGNOSIS — G89.29 CHRONIC PAIN OF LEFT KNEE: ICD-10-CM

## 2024-11-11 PROCEDURE — 97110 THERAPEUTIC EXERCISES: CPT | Mod: GP

## 2024-11-11 NOTE — PROGRESS NOTES
Physical Therapy Treatment Note      Patient Name Cydney Fox  MRN: [id number]  Today's Date: 11/11/2024  Time Calculation  Start Time: 1200  Stop Time: 1240  Time Calculation (min): 40 min    Insurance: Payor: [facility] HEALTH PLAN / Plan: [facility] [employer] [facility] / Product Type: *No Product type* /  VISITS ARE MED NEC NEEDS AUTH EVOLENT AFTER 8 VISITS PAYS %   Visit #: 2  Date of Onset: 3/1/2024   -authorization required: yes  after 8 visits   -number of visits authorized N/A currently  -authorization/ certification dates: N/A currently    Problem List Items Addressed This Visit             ICD-10-CM    Chronic pain of left knee M25.562, G89.29       General:  Referred by: Nicole Reza MD   Next MD appt: none    Precautions: low fall risk  Recent fall OOB    Subjective:  General:  Non-compliant with HEP due to having inc pain from standing in line to vote  Reports fibro has been flared up  Reports she is tapering off some of her meds due to dizziness    Functional Progress:  Pain with all activity  Tries to walk the dog 1x day  Did some gardening-sitting in chair and repotting plants    Compliant with HEP:  no    Understanding of HEP: needs review    Pain  Pain assessment 0-10  Pain score: 4  Pain location: L knee    Objective:  Therapeutic Exercise  40 minutes  see below  **indicates new exercises or progression  NP=not performed    Neuromuscular Re-education:    minutes  See below  **indicates new exercises or progression  NP=not performed    Modalities   Cold Pack                          minutes    Vasopneumatic Device    minutes    Other   AROM L knee flexion 130    Exercise Log:  Nustep 6.5 '  L1 **  Adductor sets 20x  Hooklying hip abduction green 15x   SLR 10x2  LAQ 10x2 **  Seated hamstring curl red 10x2 **  Supine hamstring stretch strap 10 sec 10x   Sidelying hip abduction 10x2 **    Education:  Instructed in  progression of exercises  Encouraged inc activity level at home and inc compliance with HEP    Assessment:  skilled intervention: exercise progression for strength    Patient would continue to benefit from skilled PT to address remaining functional, objective and subjective deficits to allow them to return to full independence with ADLs     Progressed LE strengthening/flexibility  Improvement noted with L knee flexion    Plan:  Inc nustep time  SLS  Calf raises

## 2024-11-18 ENCOUNTER — TREATMENT (OUTPATIENT)
Dept: PHYSICAL THERAPY | Facility: CLINIC | Age: 49
End: 2024-11-18
Payer: COMMERCIAL

## 2024-11-18 DIAGNOSIS — M25.562 CHRONIC PAIN OF LEFT KNEE: ICD-10-CM

## 2024-11-18 DIAGNOSIS — G89.29 CHRONIC PAIN OF LEFT KNEE: ICD-10-CM

## 2024-11-18 PROCEDURE — 97110 THERAPEUTIC EXERCISES: CPT | Mod: GP

## 2024-11-18 PROCEDURE — 97112 NEUROMUSCULAR REEDUCATION: CPT | Mod: GP

## 2024-11-18 NOTE — PROGRESS NOTES
Physical Therapy Treatment Note      Patient Name Cydney Fox  MRN: [id number]  Today's Date: 11/18/2024  Time Calculation  Start Time: 1115  Stop Time: 1155  Time Calculation (min): 40 min    Insurance: Payor: [facility] PLAN / Plan: [facility] [employer] PLAN / Product Type: *No Product type* /  VISITS ARE MED NEC NEEDS AUTH EVOLENT AFTER 8 VISITS PAYS %   Visit #: 3  Date of Onset: 3/1/2024   -authorization required: yes  after 8 visits   -number of visits authorized N/A currently  -authorization/ certification dates: N/A currently    Problem List Items Addressed This Visit             ICD-10-CM    Chronic pain of left knee M25.562, G89.29       General:  Referred by: Nicole Reza MD   Next MD appt: none    Precautions: low fall risk  Recent fall OOB    Subjective:  General:  Pain can escalate as high as 7  Weather changes affect pain -some concern about the upcoming week    Functional Progress:  Reports being more active the last week with walking dogs and just being up and around more  Worked at craft table for 3 hours last night making ornaments    Compliant with HEP:  yes    Understanding of HEP: WNL    Pain  Pain assessment 0-10  Pain score: 5  Pain location: L knee    Objective:  Therapeutic Exercise  30 minutes  see below  **indicates new exercises or progression  NP=not performed    Neuromuscular Re-education:  10 minutes  See below  **indicates new exercises or progression  NP=not performed    Other     Exercise Log:  Nustep 7 '  L1   Adductor sets 20x  Hooklying hip abduction green 15x   SLR 10x2  LAQ 10x2   Seated hamstring curl green 10x2 **  Supine hamstring stretch strap 10 sec 10x   Sidelying hip abduction 10x2     SLS LLE 10 sec 10x **  Calf raises 10x2 **  Standing hip abd/ext 2# 10x **    Education:  Instructed in progression of exercises    Assessment:  skilled intervention: exercise progression for  strength/balance    Patient would continue to benefit from skilled PT to address remaining functional, objective and subjective deficits to allow them to return to full independence with ADLs     Progressed in CKC with balance activities and strengthening with good tolerance of no inc pain and mild c/o fatigue    Plan:  Step exercises

## 2024-11-19 DIAGNOSIS — L21.9 SEBORRHEIC DERMATITIS, UNSPECIFIED: ICD-10-CM

## 2024-11-19 DIAGNOSIS — M79.7 FIBROMYALGIA: ICD-10-CM

## 2024-11-19 RX ORDER — FOLIC ACID 1 MG/1
1 TABLET ORAL DAILY
Qty: 90 TABLET | Refills: 1 | Status: SHIPPED | OUTPATIENT
Start: 2024-11-19

## 2024-11-19 RX ORDER — PREGABALIN 50 MG/1
50 CAPSULE ORAL DAILY
Qty: 30 CAPSULE | Refills: 5 | Status: SHIPPED | OUTPATIENT
Start: 2024-11-19 | End: 2025-05-18

## 2024-11-25 ENCOUNTER — TREATMENT (OUTPATIENT)
Dept: PHYSICAL THERAPY | Facility: CLINIC | Age: 49
End: 2024-11-25
Payer: COMMERCIAL

## 2024-11-25 DIAGNOSIS — G89.29 CHRONIC PAIN OF LEFT KNEE: ICD-10-CM

## 2024-11-25 DIAGNOSIS — M25.562 CHRONIC PAIN OF LEFT KNEE: ICD-10-CM

## 2024-11-25 PROCEDURE — 97110 THERAPEUTIC EXERCISES: CPT | Mod: GP

## 2024-11-25 PROCEDURE — 97112 NEUROMUSCULAR REEDUCATION: CPT | Mod: GP

## 2024-11-25 NOTE — PROGRESS NOTES
"                                                                Physical Therapy Treatment Note      Patient Name Cydney Fox  MRN: [id number]  Today's Date: 11/25/2024  Time Calculation  Start Time: 1245  Stop Time: 1325  Time Calculation (min): 40 min    Insurance: Payor: [facility] PLAN / Plan: [facility] PLAN / Product Type: *No Product type* /  VISITS ARE MED NEC NEEDS AUTH EVOLENT AFTER 8 VISITS PAYS %   Visit #: 4  Date of Onset: 3/1/2024   -authorization required: yes  after 8 visits   -number of visits authorized N/A currently  -authorization/ certification dates: N/A currently    Problem List Items Addressed This Visit             ICD-10-CM    Chronic pain of left knee M25.562, G89.29         General:  Referred by: Nicole Reza MD   Next MD appt: none    Precautions: low fall risk  Recent fall OOB    Subjective:  General:  Hurting all over from fibromyalgia    Functional Progress:  Doing crafts as tolerated     Compliant with HEP:  yes    Understanding of HEP: WNL    Pain  Pain assessment 0-10  Pain score:  4  Pain location: L knee    Objective:  Therapeutic Exercise  30 minutes  see below  **indicates new exercises or progression  NP=not performed    Neuromuscular Re-education:  10 minutes  See below  **indicates new exercises or progression  NP=not performed    Other     Exercise Log:  Nustep 5 '  L1   Adductor sets 20x  Hooklying hip abduction green 15x   SLR 10x2  LAQ 10x2   Seated hamstring curl green 10x2   Supine hamstring stretch strap 10 sec 10x   Sidelying hip abduction 10x2   Bridge 5x **     SLS LLE 10 sec 10x   Calf raises 10x2   Standing hip abd/ext 2# 10x   Step ups F/L 4\" 10x **    Education:  Instructed in progression of exercises  Discussed community water programs and given written information    Assessment:  skilled intervention: exercise progression for function    Patient would continue to benefit from skilled PT to address remaining functional, " objective and subjective deficits to allow them to return to full independence with ADLs     Progressed with function with step exercises  Able to complete full program despite fibromyalgia pain complaints today  Provided information for water exercise which I feel could be beneficial for patient    Plan:  Airex for SLS and calf raises

## 2024-12-02 ENCOUNTER — APPOINTMENT (OUTPATIENT)
Dept: PHYSICAL THERAPY | Facility: CLINIC | Age: 49
End: 2024-12-02
Payer: COMMERCIAL

## 2024-12-02 ENCOUNTER — DOCUMENTATION (OUTPATIENT)
Dept: PHYSICAL THERAPY | Facility: CLINIC | Age: 49
End: 2024-12-02
Payer: COMMERCIAL

## 2024-12-02 NOTE — PROGRESS NOTES
Physical Therapy                 Therapy Communication Note    Patient Name: Cydney Fox  MRN: [id number]  Department:   Room: Room/bed info not found  Today's Date: 12/2/2024     Discipline: Physical Therapy    Missed Visit Reason:      Missed Time: Cancel    Comment:via my chart

## 2024-12-09 ENCOUNTER — APPOINTMENT (OUTPATIENT)
Dept: PHYSICAL THERAPY | Facility: CLINIC | Age: 49
End: 2024-12-09
Payer: COMMERCIAL

## 2024-12-11 DIAGNOSIS — J45.20 MILD INTERMITTENT ASTHMA WITHOUT COMPLICATION (HHS-HCC): ICD-10-CM

## 2024-12-11 RX ORDER — ALBUTEROL SULFATE 90 UG/1
2 AEROSOL, METERED RESPIRATORY (INHALATION) 3 TIMES DAILY PRN
Qty: 18 G | Refills: 1 | Status: SHIPPED | OUTPATIENT
Start: 2024-12-11

## 2024-12-16 ENCOUNTER — TREATMENT (OUTPATIENT)
Dept: PHYSICAL THERAPY | Facility: CLINIC | Age: 49
End: 2024-12-16
Payer: COMMERCIAL

## 2024-12-16 DIAGNOSIS — G89.29 CHRONIC PAIN OF LEFT KNEE: Primary | ICD-10-CM

## 2024-12-16 DIAGNOSIS — M25.562 CHRONIC PAIN OF LEFT KNEE: Primary | ICD-10-CM

## 2024-12-16 PROCEDURE — 97112 NEUROMUSCULAR REEDUCATION: CPT | Mod: GP

## 2024-12-16 PROCEDURE — 97110 THERAPEUTIC EXERCISES: CPT | Mod: GP

## 2024-12-16 NOTE — PROGRESS NOTES
"                                                                Physical Therapy Treatment Note      Patient Name Cydney Fox  MRN: [id number]  Today's Date: 12/16/2024  Time Calculation  Start Time: 1115  Stop Time: 1155  Time Calculation (min): 40 min    Insurance: Payor: [facility] PLAN / Plan: [facility] [employer] PLAN / Product Type: *No Product type* /  VISITS ARE MED NEC NEEDS AUTH EVOLENT AFTER 8 VISITS PAYS %   Visit #: 5  Date of Onset: 3/1/2024   -authorization required: yes  after 8 visits   -number of visits authorized N/A currently  -authorization/ certification dates: N/A currently    Problem List Items Addressed This Visit             ICD-10-CM    Chronic pain of left knee - Primary M25.562, G89.29         General:  Referred by: Nicole Reza MD   Next MD appt: none    Precautions: low fall risk  Recent fall OOB    Subjective:  General:  Has been struggling with pain all over  Slipped on ice while walking the dog and twisted the L knee but \"it seems to be ok\"  Weaning off some of her meds with \"side effects\"    Functional Progress:  Doing crafting as able  Having trouble sleeping due to pain  Reports being more mobile  Doing better on steps at home    Compliant with HEP:  yes/partial    Understanding of HEP: WNL    Pain  Pain assessment 0-10  Pain score: 7  Pain location: L knee    Objective:  Therapeutic Exercise  30 minutes  see below  **indicates new exercises or progression  NP=not performed    Neuromuscular Re-education:  10 minutes  See below  **indicates new exercises or progression  NP=not performed      Other     Exercise Log:  AROM L knee flexion 126  Nustep 5 '  L1   Adductor sets 20x  Hooklying hip abduction green 15x   SLR 10x2  LAQ 10x2   Seated hamstring curl green 10x2   Supine hamstring stretch strap 10 sec 10x   Sidelying hip abduction 10x  Bridge 5x      SLS LLE 10 sec 10x airex **  Calf raises 10x2  airex  **  Standing hip abd/ext 2# 10x   Step ups F/L " "4\" 10x     Education:  Instructed in progression of exercises    Assessment:  skilled intervention: exercise progression for balance    Patient would continue to benefit from skilled PT to address remaining functional, objective and subjective deficits to allow them to return to full independence with ADLs     Progressed to airex with balance activities  Performs exercises slowly but able to complete full program  Knee flexion has improved 12 degrees from eval    Plan:  1 more visit for final reasmt then discharge to [facility]    "

## 2024-12-23 ENCOUNTER — APPOINTMENT (OUTPATIENT)
Dept: PHYSICAL THERAPY | Facility: CLINIC | Age: 49
End: 2024-12-23
Payer: COMMERCIAL

## 2024-12-23 ENCOUNTER — DOCUMENTATION (OUTPATIENT)
Dept: PHYSICAL THERAPY | Facility: CLINIC | Age: 49
End: 2024-12-23
Payer: COMMERCIAL

## 2024-12-23 NOTE — PROGRESS NOTES
Physical Therapy                 Therapy Communication Note    Patient Name: Cydney Fox  MRN: [id number]  Department:   Room: Room/bed info not found  Today's Date: 12/23/2024     Discipline: Physical Therapy          Missed Visit Reason:      Missed Time: Cancel    Comment: pain/swelling

## 2024-12-30 ENCOUNTER — DOCUMENTATION (OUTPATIENT)
Dept: PHYSICAL THERAPY | Facility: CLINIC | Age: 49
End: 2024-12-30
Payer: COMMERCIAL

## 2024-12-30 NOTE — PROGRESS NOTES
Physical Therapy                 Therapy Communication Note    Patient Name: Cydney Fox  MRN: [id number]  Department:   Room: Room/bed info not found  Today's Date: 12/30/2024     Discipline: Physical Therapy          Missed Visit Reason:      Missed Time: Cancel    Comment: pt cancelled last appt and has not called back to reschedule . She will be discharge with no final reasmt available

## 2025-01-02 ENCOUNTER — HOSPITAL ENCOUNTER (EMERGENCY)
Facility: HOSPITAL | Age: 50
Discharge: HOME | End: 2025-01-02
Payer: COMMERCIAL

## 2025-01-02 ENCOUNTER — APPOINTMENT (OUTPATIENT)
Dept: RADIOLOGY | Facility: HOSPITAL | Age: 50
End: 2025-01-02
Payer: COMMERCIAL

## 2025-01-02 VITALS
WEIGHT: 235 LBS | SYSTOLIC BLOOD PRESSURE: 138 MMHG | HEIGHT: 67 IN | RESPIRATION RATE: 18 BRPM | TEMPERATURE: 97.7 F | HEART RATE: 93 BPM | BODY MASS INDEX: 36.88 KG/M2 | OXYGEN SATURATION: 99 % | DIASTOLIC BLOOD PRESSURE: 73 MMHG

## 2025-01-02 DIAGNOSIS — J45.31 MILD PERSISTENT ASTHMA WITH EXACERBATION (HHS-HCC): ICD-10-CM

## 2025-01-02 DIAGNOSIS — J45.30 MILD PERSISTENT ASTHMA WITHOUT COMPLICATION (HHS-HCC): Primary | ICD-10-CM

## 2025-01-02 DIAGNOSIS — B34.9 VIRAL ILLNESS: Primary | ICD-10-CM

## 2025-01-02 LAB
FLUAV RNA RESP QL NAA+PROBE: NOT DETECTED
FLUBV RNA RESP QL NAA+PROBE: NOT DETECTED
RSV RNA RESP QL NAA+PROBE: NOT DETECTED
SARS-COV-2 RNA RESP QL NAA+PROBE: NOT DETECTED

## 2025-01-02 PROCEDURE — 71046 X-RAY EXAM CHEST 2 VIEWS: CPT | Performed by: RADIOLOGY

## 2025-01-02 PROCEDURE — 99283 EMERGENCY DEPT VISIT LOW MDM: CPT | Mod: 25

## 2025-01-02 PROCEDURE — 2500000002 HC RX 250 W HCPCS SELF ADMINISTERED DRUGS (ALT 637 FOR MEDICARE OP, ALT 636 FOR OP/ED): Performed by: NURSE PRACTITIONER

## 2025-01-02 PROCEDURE — 94640 AIRWAY INHALATION TREATMENT: CPT

## 2025-01-02 PROCEDURE — 2500000004 HC RX 250 GENERAL PHARMACY W/ HCPCS (ALT 636 FOR OP/ED): Performed by: NURSE PRACTITIONER

## 2025-01-02 PROCEDURE — 99284 EMERGENCY DEPT VISIT MOD MDM: CPT | Mod: 25

## 2025-01-02 PROCEDURE — 87637 SARSCOV2&INF A&B&RSV AMP PRB: CPT | Performed by: NURSE PRACTITIONER

## 2025-01-02 PROCEDURE — 71046 X-RAY EXAM CHEST 2 VIEWS: CPT

## 2025-01-02 RX ORDER — IPRATROPIUM BROMIDE 17 UG/1
2 AEROSOL, METERED RESPIRATORY (INHALATION) EVERY 8 HOURS PRN
Qty: 12.9 G | Refills: 1 | Status: SHIPPED | OUTPATIENT
Start: 2025-01-02 | End: 2025-02-01

## 2025-01-02 RX ORDER — PREDNISONE 20 MG/1
40 TABLET ORAL DAILY
Qty: 8 TABLET | Refills: 0 | Status: SHIPPED | OUTPATIENT
Start: 2025-01-03 | End: 2025-01-07

## 2025-01-02 RX ORDER — IPRATROPIUM BROMIDE AND ALBUTEROL SULFATE 2.5; .5 MG/3ML; MG/3ML
3 SOLUTION RESPIRATORY (INHALATION) ONCE
Status: COMPLETED | OUTPATIENT
Start: 2025-01-02 | End: 2025-01-02

## 2025-01-02 RX ORDER — PREDNISONE 20 MG/1
40 TABLET ORAL ONCE
Status: COMPLETED | OUTPATIENT
Start: 2025-01-02 | End: 2025-01-02

## 2025-01-02 RX ADMIN — PREDNISONE 40 MG: 20 TABLET ORAL at 12:06

## 2025-01-02 RX ADMIN — IPRATROPIUM BROMIDE AND ALBUTEROL SULFATE 3 ML: .5; 3 SOLUTION RESPIRATORY (INHALATION) at 12:35

## 2025-01-02 ASSESSMENT — LIFESTYLE VARIABLES
EVER HAD A DRINK FIRST THING IN THE MORNING TO STEADY YOUR NERVES TO GET RID OF A HANGOVER: NO
HAVE YOU EVER FELT YOU SHOULD CUT DOWN ON YOUR DRINKING: NO
EVER FELT BAD OR GUILTY ABOUT YOUR DRINKING: NO
TOTAL SCORE: 0
HAVE PEOPLE ANNOYED YOU BY CRITICIZING YOUR DRINKING: NO

## 2025-01-02 NOTE — ED PROVIDER NOTES
HPI   Chief Complaint   Patient presents with    Flu Symptoms     PT PRESENTS TO ED FROM HOME VIA PRIVATE AUTO FOR FLU SYMPTOMS THAT STARTED MONDAY. ENDORSES COUGH, RUNNY NOSE, HEADACHE, FATIGUE. DENIES FEVER/ CHILLS/ CHEST PAIN/ SOB. H(X) ASTHMA.        Patient is a 49-year-old female with past medical history of asthma who presents ED today due to congestion, cough, runny nose, and wheezing.  She denies any chest pain or shortness of breath.  She was exposed to her father who was recently diagnosed with COVID.  Her symptoms started about 4 days ago.  She has been trying her home medications for asthma but they do not seem to be relieving the symptoms for normal amount of time.      History provided by:  Patient   used: No            Patient History   Past Medical History:   Diagnosis Date    Abdominal distension (gaseous) 02/16/2016    Abdominal bloating    Abnormal levels of other serum enzymes 02/16/2016    Elevated liver enzymes    Acute pharyngitis, unspecified 12/26/2014    Sore throat    Acute upper respiratory infection, unspecified 03/20/2018    Acute URI    Acute upper respiratory infection, unspecified 04/12/2017    Acute URI    Calculus of gallbladder without cholecystitis without obstruction 03/29/2016    Cholelithiasis without obstruction    Chondrocostal junction syndrome (tietze)     Costochondritis    Depression, unspecified 07/01/2022    Depression    Disease of gallbladder, unspecified     Gallbladder problem    Impacted cerumen, left ear 07/16/2019    Impacted cerumen of left ear    Localized enlarged lymph nodes 11/15/2017    Cervical lymphadenopathy    Nasal congestion     Nasal congestion    Noninfective gastroenteritis and colitis, unspecified 04/08/2016    Gastroenteritis, acute    Other amnesia     Memory problem    Other diseases of pharynx     Irritated throat    Otorrhea, unspecified ear     Ear drainage    Pelvic and perineal pain 06/30/2016    Adnexal tenderness     Personal history of diseases of the blood and blood-forming organs and certain disorders involving the immune mechanism 02/05/2014    History of leukocytosis    Personal history of malignant neoplasm of thyroid     History of malignant neoplasm of thyroid    Personal history of other diseases of the circulatory system     History of mitral valve prolapse    Personal history of other diseases of the digestive system 08/16/2021    History of gastritis    Personal history of other diseases of the digestive system 02/05/2014    History of gastritis    Personal history of other diseases of the female genital tract 06/28/2016    History of irregular menstrual cycles    Personal history of other diseases of the musculoskeletal system and connective tissue     History of arthritis    Personal history of other diseases of the musculoskeletal system and connective tissue     Personal history of fibromyalgia    Personal history of other diseases of the respiratory system 03/29/2016    History of acute bronchitis    Personal history of other diseases of the respiratory system     Personal history of asthma    Personal history of other specified conditions     History of headache    Personal history of other specified conditions     History of shortness of breath    Personal history of other specified conditions 02/24/2016    History of nausea    Personal history of other specified conditions     History of wheezing    Personal history of other specified conditions 02/22/2016    History of abdominal pain    Sneezing     Sneezing    Unspecified asthma with (acute) exacerbation ([facility]) 03/28/2016    Asthma exacerbation    Unspecified epiphora, bilateral     Watery eyes     Past Surgical History:   Procedure Laterality Date    CHOLECYSTECTOMY  03/28/2016    Cholecystectomy    MR HEAD ANGIO WO IV CONTRAST  9/16/2022    MR HEAD ANGIO WO IV CONTRAST 9/16/2022 [facility]    MR NECK ANGIO WO IV CONTRAST  9/16/2022    MR NECK ANGIO  WO IV CONTRAST 9/16/2022 [facility]    OTHER SURGICAL HISTORY  09/16/2021    Colonoscopy    TOTAL THYROIDECTOMY  12/15/2015    Thyroid Surgery Total Thyroidectomy     Family History   Problem Relation Name Age of Onset    Thyroid cancer Mother      Fibromyalgia Mother      Hypothyroidism Mother      Diabetes Father      Hyperlipidemia Father      Hypertension Father      Lung cancer Maternal Grandmother      Other (partial thyroidectomy) Paternal Grandmother      Breast cancer Father's Sister  65    Allergies Other Family History     Hypertension Other Family History     Hearing loss Other Family History      Social History     Tobacco Use    Smoking status: Never     Passive exposure: Never    Smokeless tobacco: Never   Substance Use Topics    Alcohol use: Never    Drug use: Never       Physical Exam   ED Triage Vitals [01/02/25 1112]   Temperature Heart Rate Respirations BP   36.5 °C (97.7 °F) 93 18 138/73      Pulse Ox Temp Source Heart Rate Source Patient Position   96 % Temporal Monitor Sitting      BP Location FiO2 (%)     Right arm --       Physical Exam  Vitals and nursing note reviewed.   Constitutional:       General: She is not in acute distress.     Appearance: She is well-developed.   HENT:      Head: Normocephalic and atraumatic.      Nose: Congestion and rhinorrhea present. Rhinorrhea is clear.   Eyes:      Conjunctiva/sclera: Conjunctivae normal.   Cardiovascular:      Rate and Rhythm: Normal rate and regular rhythm.      Heart sounds: No murmur heard.  Pulmonary:      Effort: Pulmonary effort is normal. No respiratory distress.      Breath sounds: Wheezing present.   Abdominal:      Palpations: Abdomen is soft.      Tenderness: There is no abdominal tenderness.   Musculoskeletal:         General: No swelling.      Cervical back: Neck supple.   Skin:     General: Skin is warm and dry.      Capillary Refill: Capillary refill takes less than 2 seconds.   Neurological:      Mental Status: She is  alert.   Psychiatric:         Mood and Affect: Mood normal.           ED Course & MDM   ED Course as of 01/02/25 1312   Thu Jan 02, 2025   1157 XR chest 2 views  No focal infiltrate or pneumothorax. [WS]   1209 Sars-CoV-2 and Influenza A/B PCR  Negative for influenza and COVID [WS]   1210 RSV PCR  Negative [WS]      ED Course User Index  [WS] Rafa Projosé miguel, APRN-CNP         Diagnoses as of 01/02/25 1312   Viral illness   Mild persistent asthma with exacerbation ([facility])                 No data recorded     Afia Coma Scale Score: 15 (01/02/25 1112 : Shantel Tejada, ALIDA)                           Medical Decision Making    Medical Decision Making & ED Course  Medical Decision Making:  Patient is a 49-year-old female with past medical history of asthma who presents ED today due to congestion, cough, runny nose, and wheezing.  She denies any chest pain or shortness of breath.  She was exposed to her father who was recently diagnosed with COVID.  Her symptoms started about 4 days ago.  She has been trying her home medications for asthma but they do not seem to be relieving the symptoms for normal amount of time.  Patient's viral swabs were negative for RSV, COVID, influenza.  Her chest x-ray was negative for acute cardiopulmonary process.  Her vital signs remained stable throughout her visit the emergency department.  Her symptoms are consistent with a viral illness with an exacerbation of her asthma.  She was given steroids and breathing treatment in the ED.  She is feels substantially better would like to be discharged home.  She was given a prescription for steroids at home.  Return if questions are discussed with patient she verbalized understanding of these.  --  Differential diagnoses considered include but are not limited to: Viral illness, COVID, RSV, influenza, pneumonia, asthma exacerbation.     Social Determinants of Health which Significantly Impact Care: None identified     EKG Independent  Interpretation: EKG not obtained    Independent Result Review and Interpretation: Relevant laboratory and radiographic results were reviewed and independently interpreted by myself.  As necessary, they are commented on in the ED Course.    Chronic conditions affecting the patient's care: As documented above in [facility]    The patient was discussed with the following consultants/services: None    Care Considerations: As documented above in [facility]    ED Course:  ED Course as of 01/02/25 1312  ------------------------------------------------------------  Time: 01/02 1157  Value: XR chest 2 views  Comment: No focal infiltrate or pneumothorax.  By: ROSANNE Meyers  ------------------------------------------------------------  Time: 01/02 1209  Value: Sars-CoV-2 and Influenza A/B PCR  Comment: Negative for influenza and COVID  By: ROSANNE Meyers  ------------------------------------------------------------  Time: 01/02 1210  Value: RSV PCR  Comment: Negative  By: ROSANNE Meyers    ------------------------------------------------------------  Diagnoses as of 01/02/25 1312  Viral illness  Mild persistent asthma with exacerbation ([facility])     Disposition  As a result of the work-up, the patient was discharged home.  she was informed of her diagnosis and instructed to come back with any concerns or worsening of condition.  she and was agreeable to the plan as discussed above.  she was given the opportunity to ask questions.  All of the patient's questions were answered.      Patient was seen independently    ROSANNE Meyers  Emergency Medicine          Amount and/or Complexity of Data Reviewed  Labs: ordered. Decision-making details documented in ED Course.  Radiology: ordered and independent interpretation performed. Decision-making details documented in ED Course.    Risk  OTC drugs.  Prescription drug management.        Procedure  Procedures     ROSANNE Meyers  01/02/25  1317

## 2025-01-08 ENCOUNTER — OFFICE VISIT (OUTPATIENT)
Dept: PRIMARY CARE | Facility: CLINIC | Age: 50
End: 2025-01-08
Payer: COMMERCIAL

## 2025-01-08 VITALS
WEIGHT: 230 LBS | OXYGEN SATURATION: 97 % | HEART RATE: 79 BPM | HEIGHT: 67 IN | DIASTOLIC BLOOD PRESSURE: 75 MMHG | SYSTOLIC BLOOD PRESSURE: 120 MMHG | BODY MASS INDEX: 36.1 KG/M2

## 2025-01-08 DIAGNOSIS — J45.41 MODERATE PERSISTENT ASTHMA WITH EXACERBATION (HHS-HCC): ICD-10-CM

## 2025-01-08 DIAGNOSIS — J40 BRONCHITIS: Primary | ICD-10-CM

## 2025-01-08 PROCEDURE — 99213 OFFICE O/P EST LOW 20 MIN: CPT | Performed by: INTERNAL MEDICINE

## 2025-01-08 PROCEDURE — 3078F DIAST BP <80 MM HG: CPT | Performed by: INTERNAL MEDICINE

## 2025-01-08 PROCEDURE — 3074F SYST BP LT 130 MM HG: CPT | Performed by: INTERNAL MEDICINE

## 2025-01-08 PROCEDURE — 1036F TOBACCO NON-USER: CPT | Performed by: INTERNAL MEDICINE

## 2025-01-08 PROCEDURE — 3008F BODY MASS INDEX DOCD: CPT | Performed by: INTERNAL MEDICINE

## 2025-01-08 RX ORDER — AZITHROMYCIN 250 MG/1
TABLET, FILM COATED ORAL
Qty: 6 TABLET | Refills: 0 | Status: SHIPPED | OUTPATIENT
Start: 2025-01-08 | End: 2025-01-13

## 2025-01-08 RX ORDER — FLUTICASONE PROPIONATE 110 UG/1
1 AEROSOL, METERED RESPIRATORY (INHALATION)
Qty: 12 G | Refills: 0 | Status: SHIPPED | OUTPATIENT
Start: 2025-01-08 | End: 2026-01-08

## 2025-01-08 ASSESSMENT — ENCOUNTER SYMPTOMS: SHORTNESS OF BREATH: 1

## 2025-01-08 NOTE — PROGRESS NOTES
"Subjective   Patient ID: Cydney Fox is a 49 y.o. female who presents for Follow-up.    Shortness of Breath  This is a chronic problem. The current episode started more than 1 year ago. The problem occurs constantly. The problem has been waxing and waning. The average episode lasts 15 minutes. Associated symptoms include chest pain. The symptoms are aggravated by emotional upset, smoke, exercise, any activity and lying flat.      Seen at ER for cough and shortness of breath.  Chest x-ray was okay.  Diagnosed with asthma exacerbation and viral illness.  Treated with steroids and inhaler.  Still coughing and still wheezing  No fever  No sinus symptoms.  Has chest congestion and tightness and is feeling worse    Review of Systems   Respiratory:  Positive for shortness of breath.    Cardiovascular:  Positive for chest pain.       Objective   /75   Pulse 79   Ht 1.702 m (5' 7\")   Wt 104 kg (230 lb)   SpO2 97%   BMI 36.02 kg/m²     Physical Exam  In general looks tired and sick.  Coughing intermittently  No pallor or icterus.  No sinus tenderness or throat erythema  Normal TM  Neck without lymphadenopathy or JVD  Chest with expiratory rhonchi after coughing  Good air entry  Extremities no edema  Assessment/Plan   Problem List Items Addressed This Visit             ICD-10-CM    Bronchitis - Primary J40    Relevant Medications    azithromycin (Zithromax) 250 mg tablet     Other Visit Diagnoses         Codes    Moderate persistent asthma with exacerbation ([facility])     J45.41    Relevant Medications    fluticasone (Flovent) 110 mcg/actuation inhaler          Patient has persisting and worsening cough.  Will give Zithromax and start Flovent.  Continue steroids and inhalers.  Chest x-ray was okay  Will add more oral steroids if symptoms are not improving     "

## 2025-01-13 DIAGNOSIS — J45.31 MILD PERSISTENT ASTHMA WITH EXACERBATION (HHS-HCC): ICD-10-CM

## 2025-01-13 RX ORDER — PREDNISONE 20 MG/1
20 TABLET ORAL DAILY
Qty: 5 TABLET | Refills: 0 | Status: SHIPPED | OUTPATIENT
Start: 2025-01-13 | End: 2025-01-18

## 2025-01-24 DIAGNOSIS — J45.20 MILD INTERMITTENT ASTHMA WITHOUT COMPLICATION (HHS-HCC): ICD-10-CM

## 2025-01-24 RX ORDER — ALBUTEROL SULFATE 90 UG/1
INHALANT RESPIRATORY (INHALATION)
Qty: 18 G | Refills: 11 | Status: SHIPPED | OUTPATIENT
Start: 2025-01-24 | End: 2026-01-24

## 2025-01-28 ASSESSMENT — ENCOUNTER SYMPTOMS
HEADACHES: 1
COUGH: 1
SHORTNESS OF BREATH: 1

## 2025-01-29 ENCOUNTER — OFFICE VISIT (OUTPATIENT)
Dept: PRIMARY CARE | Facility: CLINIC | Age: 50
End: 2025-01-29
Payer: COMMERCIAL

## 2025-01-29 VITALS
DIASTOLIC BLOOD PRESSURE: 77 MMHG | OXYGEN SATURATION: 98 % | WEIGHT: 226.1 LBS | SYSTOLIC BLOOD PRESSURE: 109 MMHG | HEART RATE: 97 BPM | BODY MASS INDEX: 35.49 KG/M2 | HEIGHT: 67 IN

## 2025-01-29 DIAGNOSIS — D32.9 MENINGIOMA (MULTI): ICD-10-CM

## 2025-01-29 DIAGNOSIS — J45.41 MODERATE PERSISTENT ASTHMA WITH EXACERBATION (HHS-HCC): Primary | ICD-10-CM

## 2025-01-29 DIAGNOSIS — K29.00 ACUTE GASTRITIS WITHOUT HEMORRHAGE, UNSPECIFIED GASTRITIS TYPE: ICD-10-CM

## 2025-01-29 DIAGNOSIS — G82.20 PARAPARESIS (MULTI): ICD-10-CM

## 2025-01-29 DIAGNOSIS — E11.42 CONTROLLED TYPE 2 DIABETES MELLITUS WITH DIABETIC POLYNEUROPATHY, WITHOUT LONG-TERM CURRENT USE OF INSULIN: ICD-10-CM

## 2025-01-29 PROCEDURE — 1036F TOBACCO NON-USER: CPT | Performed by: INTERNAL MEDICINE

## 2025-01-29 PROCEDURE — 99213 OFFICE O/P EST LOW 20 MIN: CPT | Performed by: INTERNAL MEDICINE

## 2025-01-29 PROCEDURE — 3008F BODY MASS INDEX DOCD: CPT | Performed by: INTERNAL MEDICINE

## 2025-01-29 PROCEDURE — 3074F SYST BP LT 130 MM HG: CPT | Performed by: INTERNAL MEDICINE

## 2025-01-29 PROCEDURE — 3078F DIAST BP <80 MM HG: CPT | Performed by: INTERNAL MEDICINE

## 2025-01-29 PROCEDURE — 96372 THER/PROPH/DIAG INJ SC/IM: CPT | Performed by: INTERNAL MEDICINE

## 2025-01-29 RX ORDER — TRIAMCINOLONE ACETONIDE 40 MG/ML
40 INJECTION, SUSPENSION INTRA-ARTICULAR; INTRAMUSCULAR ONCE
Status: COMPLETED | OUTPATIENT
Start: 2025-01-29 | End: 2025-01-29

## 2025-01-29 RX ORDER — PANTOPRAZOLE SODIUM 40 MG/1
40 TABLET, DELAYED RELEASE ORAL DAILY
Qty: 30 TABLET | Refills: 1 | Status: SHIPPED | OUTPATIENT
Start: 2025-01-29 | End: 2025-03-30

## 2025-01-29 RX ADMIN — TRIAMCINOLONE ACETONIDE 40 MG: 40 INJECTION, SUSPENSION INTRA-ARTICULAR; INTRAMUSCULAR at 12:10

## 2025-01-29 ASSESSMENT — ENCOUNTER SYMPTOMS
SHORTNESS OF BREATH: 1
COUGH: 1
HEADACHES: 1

## 2025-01-29 ASSESSMENT — PATIENT HEALTH QUESTIONNAIRE - PHQ9
5. POOR APPETITE OR OVEREATING: NEARLY EVERY DAY
SUM OF ALL RESPONSES TO PHQ QUESTIONS 1-9: 18
9. THOUGHTS THAT YOU WOULD BE BETTER OFF DEAD, OR OF HURTING YOURSELF: NOT AT ALL
8. MOVING OR SPEAKING SO SLOWLY THAT OTHER PEOPLE COULD HAVE NOTICED. OR THE OPPOSITE, BEING SO FIGETY OR RESTLESS THAT YOU HAVE BEEN MOVING AROUND A LOT MORE THAN USUAL: NEARLY EVERY DAY
7. TROUBLE CONCENTRATING ON THINGS, SUCH AS READING THE NEWSPAPER OR WATCHING TELEVISION: NEARLY EVERY DAY
3. TROUBLE FALLING OR STAYING ASLEEP: NEARLY EVERY DAY
4. FEELING TIRED OR HAVING LITTLE ENERGY: NEARLY EVERY DAY
10. IF YOU CHECKED OFF ANY PROBLEMS, HOW DIFFICULT HAVE THESE PROBLEMS MADE IT FOR YOU TO DO YOUR WORK, TAKE CARE OF THINGS AT HOME, OR GET ALONG WITH OTHER PEOPLE: VERY DIFFICULT
2. FEELING DOWN, DEPRESSED OR HOPELESS: NEARLY EVERY DAY
1. LITTLE INTEREST OR PLEASURE IN DOING THINGS: NOT AT ALL
SUM OF ALL RESPONSES TO PHQ9 QUESTIONS 1 & 2: 3
6. FEELING BAD ABOUT YOURSELF - OR THAT YOU ARE A FAILURE OR HAVE LET YOURSELF OR YOUR FAMILY DOWN: NOT AT ALL

## 2025-01-29 NOTE — PROGRESS NOTES
"Subjective   Patient ID: Cydney Fox is a 49 y.o. female who presents for Follow-up (Walking Pneumonia).    Cough  This is a recurrent problem. The current episode started 1 to 4 weeks ago. The problem has been waxing and waning. The problem occurs every few hours. The cough is Non-productive. Associated symptoms include headaches and shortness of breath. The symptoms are aggravated by exercise. Risk factors for lung disease include animal exposure.      She has been treated with antibiotics and steroids twice for ongoing cough and wheezing.  She is on steroid inhaler and albuterol and Atrovent  Was initially okay.  For the past 2 to 3 days cough is worsening and wheezing is worse and has some upset stomach and diarrhea  No fever chills  No chest pain  Review of Systems   Respiratory:  Positive for cough and shortness of breath.    Neurological:  Positive for headaches.       Objective   /77   Pulse 97   Ht 1.702 m (5' 7\")   Wt 103 kg (226 lb 1.6 oz)   SpO2 98%   BMI 35.41 kg/m²     Physical Exam  Not in acute distress.  Looks tired  No pallor or icterus  No sinus tenderness  Neck supple no JVD or bruit or lymphadenopathy  Chest with expiratory rhonchi the end of breathing  Good air entry  CVS: S1-S2 regular not tachycardic no murmur no edema  Abdomen soft tenderness epigastrium.  No rebound or guarding and bowel sounds that  Assessment/Plan   Problem List Items Addressed This Visit             ICD-10-CM    Gastritis K29.70    Relevant Medications    pantoprazole (ProtoNix) 40 mg EC tablet    Meningioma (Multi) D32.9    Paraparesis (Multi) G82.20    Controlled type 2 diabetes mellitus with diabetic polyneuropathy E11.42    Moderate persistent asthma with exacerbation ([facility]) - Primary J45.41    Relevant Medications    triamcinolone acetonide (Kenalog-40) injection 40 mg (Start on 1/29/2025 12:15 PM)        Her asthma symptoms are not getting controlled.  Will give Kenalog injection  Some of her " symptoms could also be from gastritis and GERD.  Will give pantoprazole for few days  Continue all inhalers  Drink plenty of warm liquids    Meningioma is followed.  No worsening    Under care of endocrinologist for diabetes    No recurrence of weakness of her extremities  Follow-up if symptoms are not resolving

## 2025-02-18 DIAGNOSIS — L21.9 SEBORRHEIC DERMATITIS, UNSPECIFIED: ICD-10-CM

## 2025-02-18 RX ORDER — FOLIC ACID 1 MG/1
1 TABLET ORAL DAILY
Qty: 90 TABLET | Refills: 1 | Status: SHIPPED | OUTPATIENT
Start: 2025-02-18

## 2025-02-26 LAB
NON-UH HIE FREE T4: 1.54 NG/DL (ref 0.89–1.76)
NON-UH HIE HGB A1C: 6.8 %
NON-UH HIE TSH: 1.29 UIU/ML (ref 0.55–4.78)

## 2025-02-27 LAB
NON-UH HIE THYROGLOBULIN ANTIBODY: <1.5 IU/ML (ref 0–4)
NON-UH HIE THYROID (TPO) AB: 5.2 IU/ML (ref 0–9)

## 2025-03-03 LAB — NON-UH HIE THYROGLOBULIN: NORMAL

## 2025-03-04 ENCOUNTER — OFFICE VISIT (OUTPATIENT)
Dept: PRIMARY CARE | Facility: CLINIC | Age: 50
End: 2025-03-04
Payer: COMMERCIAL

## 2025-03-04 VITALS
DIASTOLIC BLOOD PRESSURE: 84 MMHG | HEART RATE: 84 BPM | WEIGHT: 219 LBS | BODY MASS INDEX: 34.37 KG/M2 | SYSTOLIC BLOOD PRESSURE: 125 MMHG | HEIGHT: 67 IN | OXYGEN SATURATION: 98 %

## 2025-03-04 DIAGNOSIS — M89.8X1 SHOULDER BLADE PAIN: ICD-10-CM

## 2025-03-04 DIAGNOSIS — R10.11 RIGHT UPPER QUADRANT ABDOMINAL PAIN: Primary | ICD-10-CM

## 2025-03-04 LAB
ALBUMIN SERPL-MCNC: 4.6 G/DL (ref 3.6–5.1)
ALP SERPL-CCNC: 110 U/L (ref 37–153)
ALT SERPL-CCNC: 21 U/L (ref 6–29)
ANION GAP SERPL CALCULATED.4IONS-SCNC: 12 MMOL/L (CALC) (ref 7–17)
AST SERPL-CCNC: 23 U/L (ref 10–35)
BASOPHILS # BLD AUTO: 80 CELLS/UL (ref 0–200)
BASOPHILS NFR BLD AUTO: 0.9 %
BILIRUB SERPL-MCNC: 0.9 MG/DL (ref 0.2–1.2)
BUN SERPL-MCNC: 10 MG/DL (ref 7–25)
CALCIUM SERPL-MCNC: 9.4 MG/DL (ref 8.6–10.4)
CHLORIDE SERPL-SCNC: 105 MMOL/L (ref 98–110)
CO2 SERPL-SCNC: 26 MMOL/L (ref 20–32)
CREAT SERPL-MCNC: 0.84 MG/DL (ref 0.5–1.03)
EGFRCR SERPLBLD CKD-EPI 2021: 85 ML/MIN/1.73M2
EOSINOPHIL # BLD AUTO: 676 CELLS/UL (ref 15–500)
EOSINOPHIL NFR BLD AUTO: 7.6 %
ERYTHROCYTE [DISTWIDTH] IN BLOOD BY AUTOMATED COUNT: 14.4 % (ref 11–15)
GLUCOSE SERPL-MCNC: 95 MG/DL (ref 65–99)
HCT VFR BLD AUTO: 43.5 % (ref 35–45)
HGB BLD-MCNC: 13.7 G/DL (ref 11.7–15.5)
LYMPHOCYTES # BLD AUTO: 2706 CELLS/UL (ref 850–3900)
LYMPHOCYTES NFR BLD AUTO: 30.4 %
MCH RBC QN AUTO: 26.8 PG (ref 27–33)
MCHC RBC AUTO-ENTMCNC: 31.5 G/DL (ref 32–36)
MCV RBC AUTO: 85 FL (ref 80–100)
MONOCYTES # BLD AUTO: 543 CELLS/UL (ref 200–950)
MONOCYTES NFR BLD AUTO: 6.1 %
NEUTROPHILS # BLD AUTO: 4895 CELLS/UL (ref 1500–7800)
NEUTROPHILS NFR BLD AUTO: 55 %
PLATELET # BLD AUTO: 437 THOUSAND/UL (ref 140–400)
PMV BLD REES-ECKER: 9 FL (ref 7.5–12.5)
POTASSIUM SERPL-SCNC: 4.4 MMOL/L (ref 3.5–5.3)
PROT SERPL-MCNC: 7.5 G/DL (ref 6.1–8.1)
RBC # BLD AUTO: 5.12 MILLION/UL (ref 3.8–5.1)
SODIUM SERPL-SCNC: 143 MMOL/L (ref 135–146)
WBC # BLD AUTO: 8.9 THOUSAND/UL (ref 3.8–10.8)

## 2025-03-04 PROCEDURE — 3079F DIAST BP 80-89 MM HG: CPT | Performed by: INTERNAL MEDICINE

## 2025-03-04 PROCEDURE — 3074F SYST BP LT 130 MM HG: CPT | Performed by: INTERNAL MEDICINE

## 2025-03-04 PROCEDURE — 1036F TOBACCO NON-USER: CPT | Performed by: INTERNAL MEDICINE

## 2025-03-04 PROCEDURE — 3008F BODY MASS INDEX DOCD: CPT | Performed by: INTERNAL MEDICINE

## 2025-03-04 PROCEDURE — 99213 OFFICE O/P EST LOW 20 MIN: CPT | Performed by: INTERNAL MEDICINE

## 2025-03-04 ASSESSMENT — PATIENT HEALTH QUESTIONNAIRE - PHQ9
2. FEELING DOWN, DEPRESSED OR HOPELESS: NEARLY EVERY DAY
5. POOR APPETITE OR OVEREATING: NEARLY EVERY DAY
SUM OF ALL RESPONSES TO PHQ9 QUESTIONS 1 & 2: 6
10. IF YOU CHECKED OFF ANY PROBLEMS, HOW DIFFICULT HAVE THESE PROBLEMS MADE IT FOR YOU TO DO YOUR WORK, TAKE CARE OF THINGS AT HOME, OR GET ALONG WITH OTHER PEOPLE: VERY DIFFICULT
7. TROUBLE CONCENTRATING ON THINGS, SUCH AS READING THE NEWSPAPER OR WATCHING TELEVISION: NOT AT ALL
1. LITTLE INTEREST OR PLEASURE IN DOING THINGS: NEARLY EVERY DAY
3. TROUBLE FALLING OR STAYING ASLEEP: SEVERAL DAYS
8. MOVING OR SPEAKING SO SLOWLY THAT OTHER PEOPLE COULD HAVE NOTICED. OR THE OPPOSITE, BEING SO FIGETY OR RESTLESS THAT YOU HAVE BEEN MOVING AROUND A LOT MORE THAN USUAL: NEARLY EVERY DAY
4. FEELING TIRED OR HAVING LITTLE ENERGY: NEARLY EVERY DAY
9. THOUGHTS THAT YOU WOULD BE BETTER OFF DEAD, OR OF HURTING YOURSELF: NOT AT ALL

## 2025-03-04 NOTE — PROGRESS NOTES
"Subjective   Patient ID: Cydney Fox is a 49 y.o. female who presents for Shoulder Pain (Right Shoulder), Arm Pain (Right Arm), and Abdominal Pain (X 3 days).    HPI   Has pain RUQ for 4 days.constant pain.7/10  Has nausea  Started lamictal a week ago  Not sure if symptoms from new medicine  Had GB removed  Lost weight,but diet was changed  Appetite less  Food makes it worse  No diarrhea or constipation  Has right shoulder pain for 2 weeks.more shoulder blade  No injury  No cough,fever,cp or sob  Review of Systems  No fever or chills  No headache or dizziness  Blood sugar is controlled  Objective   /84   Pulse 84   Ht 1.702 m (5' 7\")   Wt 99.3 kg (219 lb)   SpO2 98%   BMI 34.30 kg/m²     Physical Exam  Not in acute distress  No pallor or icterus  Neck without lymphadenopathy or stiffness  Right trapezius and shoulder blade area tight and tenderness medially on deep palpation of shoulder blade  Chest is clear  CVS: S1-S2 regular not tachycardic  Abdomen soft tenderness Algar and guarding right upper quadrant and epigastrium.  Difficult to palpate due to tenderness    Bowel sounds heard    Assessment/Plan   Problem List Items Addressed This Visit             ICD-10-CM    Right upper quadrant abdominal pain - Primary R10.11    Relevant Orders    CBC and Auto Differential    Comprehensive Metabolic Panel    US biliary system    Shoulder blade pain M89.8X1     Patient with nausea and upper abdominal pain and tenderness.  She also has shoulder blade pain  New medication is Lamictal  Will check labs and ultrasound  Apply heat and continue PPI  Go to ER for worsening of pain or vomiting will decide on further plan after testing is,     "

## 2025-03-05 NOTE — RESULT ENCOUNTER NOTE
Eosinophils in blood slightly high.  Other labs okay.  No treatment at this time.  Will see what ultrasound shows

## 2025-03-06 ENCOUNTER — HOSPITAL ENCOUNTER (OUTPATIENT)
Dept: RADIOLOGY | Facility: CLINIC | Age: 50
Discharge: HOME | End: 2025-03-06
Payer: COMMERCIAL

## 2025-03-06 DIAGNOSIS — R10.11 RIGHT UPPER QUADRANT ABDOMINAL PAIN: ICD-10-CM

## 2025-03-06 PROCEDURE — 76705 ECHO EXAM OF ABDOMEN: CPT

## 2025-03-07 NOTE — RESULT ENCOUNTER NOTE
Ultrasound of the liver looks okay.  May have fatty liver.  If you have ongoing pain would like to do a CT scan.  Please let me know  Will also consider FibroScan to see if you have fatty liver

## 2025-03-11 DIAGNOSIS — R10.30 LOWER ABDOMINAL PAIN: ICD-10-CM

## 2025-03-11 DIAGNOSIS — R10.11 RIGHT UPPER QUADRANT ABDOMINAL PAIN: Primary | ICD-10-CM

## 2025-03-13 DIAGNOSIS — K29.00 ACUTE GASTRITIS WITHOUT HEMORRHAGE, UNSPECIFIED GASTRITIS TYPE: ICD-10-CM

## 2025-03-13 RX ORDER — PANTOPRAZOLE SODIUM 40 MG/1
40 TABLET, DELAYED RELEASE ORAL DAILY
Qty: 90 TABLET | Refills: 1 | Status: SHIPPED | OUTPATIENT
Start: 2025-03-13

## 2025-03-22 DIAGNOSIS — E55.9 VITAMIN D DEFICIENCY: ICD-10-CM

## 2025-03-24 RX ORDER — OMEGA-3S/DHA/EPA/FISH OIL/D3 300MG-1000
2000 CAPSULE ORAL DAILY
Qty: 90 TABLET | Refills: 4 | Status: SHIPPED | OUTPATIENT
Start: 2025-03-24

## 2025-04-05 ENCOUNTER — APPOINTMENT (OUTPATIENT)
Dept: RADIOLOGY | Facility: HOSPITAL | Age: 50
End: 2025-04-05
Payer: COMMERCIAL

## 2025-04-10 ENCOUNTER — HOSPITAL ENCOUNTER (OUTPATIENT)
Dept: RADIOLOGY | Facility: HOSPITAL | Age: 50
Discharge: HOME | End: 2025-04-10
Payer: COMMERCIAL

## 2025-04-10 PROCEDURE — 2550000001 HC RX 255 CONTRASTS: Performed by: INTERNAL MEDICINE

## 2025-04-10 PROCEDURE — 74177 CT ABD & PELVIS W/CONTRAST: CPT

## 2025-04-10 RX ADMIN — IOHEXOL 75 ML: 350 INJECTION, SOLUTION INTRAVENOUS at 12:35

## 2025-04-30 ENCOUNTER — OFFICE VISIT (OUTPATIENT)
Dept: PRIMARY CARE | Facility: CLINIC | Age: 50
End: 2025-04-30
Payer: COMMERCIAL

## 2025-04-30 VITALS
DIASTOLIC BLOOD PRESSURE: 80 MMHG | HEART RATE: 88 BPM | HEIGHT: 67 IN | BODY MASS INDEX: 33.51 KG/M2 | WEIGHT: 213.5 LBS | OXYGEN SATURATION: 98 % | SYSTOLIC BLOOD PRESSURE: 110 MMHG

## 2025-04-30 DIAGNOSIS — G44.86 CERVICOGENIC HEADACHE: Primary | ICD-10-CM

## 2025-04-30 DIAGNOSIS — M79.7 FIBROMYALGIA: ICD-10-CM

## 2025-04-30 PROCEDURE — 1036F TOBACCO NON-USER: CPT | Performed by: INTERNAL MEDICINE

## 2025-04-30 PROCEDURE — 99213 OFFICE O/P EST LOW 20 MIN: CPT | Performed by: INTERNAL MEDICINE

## 2025-04-30 PROCEDURE — 3074F SYST BP LT 130 MM HG: CPT | Performed by: INTERNAL MEDICINE

## 2025-04-30 PROCEDURE — 3008F BODY MASS INDEX DOCD: CPT | Performed by: INTERNAL MEDICINE

## 2025-04-30 PROCEDURE — 3079F DIAST BP 80-89 MM HG: CPT | Performed by: INTERNAL MEDICINE

## 2025-04-30 RX ORDER — MIRTAZAPINE 15 MG/1
1 TABLET, FILM COATED ORAL NIGHTLY
COMMUNITY
Start: 2025-03-07

## 2025-04-30 RX ORDER — FLUOXETINE 10 MG/1
1 CAPSULE ORAL
COMMUNITY
Start: 2025-04-23

## 2025-04-30 RX ORDER — CYCLOBENZAPRINE HCL 10 MG
10 TABLET ORAL 2 TIMES DAILY PRN
Qty: 14 TABLET | Refills: 0 | Status: SHIPPED | OUTPATIENT
Start: 2025-04-30 | End: 2025-05-07

## 2025-04-30 RX ORDER — LAMOTRIGINE 25 MG/1
1 TABLET ORAL
COMMUNITY
Start: 2025-02-21

## 2025-04-30 ASSESSMENT — ENCOUNTER SYMPTOMS
CHOKING: 0
DIZZINESS: 0
LIGHT-HEADEDNESS: 0
RHINORRHEA: 0
TREMORS: 0
SEIZURES: 0
FEVER: 0
SHORTNESS OF BREATH: 0
SINUS PAIN: 0
SPEECH DIFFICULTY: 0
NUMBNESS: 0
WEAKNESS: 0
FACIAL ASYMMETRY: 0
ACTIVITY CHANGE: 0
COUGH: 0

## 2025-04-30 ASSESSMENT — PATIENT HEALTH QUESTIONNAIRE - PHQ9
2. FEELING DOWN, DEPRESSED OR HOPELESS: SEVERAL DAYS
1. LITTLE INTEREST OR PLEASURE IN DOING THINGS: SEVERAL DAYS
SUM OF ALL RESPONSES TO PHQ9 QUESTIONS 1 AND 2: 2

## 2025-04-30 NOTE — PROGRESS NOTES
"Subjective   Patient ID: Cydney Fox is a 49 y.o. female who presents for Follow-up (Pt states she has had a very sharp pain in right temple for last 10 days. States it is not a anywhere but the right side. States the sunlight makes it hurt worse. Tried icing it and using heat on her head and causes more pain. Is not able to take OTC pain medications due to ongoing covid symptoms. Feels the pain is going around to the back of her head and is wondering if the pain can be caused from pain that she already has in neck and shoulders.  ).    HPI   Right temporal headache for 10 days.8/10,not able to sleep.no fall or injury  Pain radiates to back of head and neck.neck is tight.  No nausea or vomiting  No vison problems    Abd pain better  Prozac was increased.no other changes  Seen endo  Hydrates well  Review of Systems   Constitutional:  Negative for activity change and fever.   HENT:  Negative for nosebleeds, postnasal drip, rhinorrhea and sinus pain.    Respiratory:  Negative for cough, choking and shortness of breath.    Neurological:  Negative for dizziness, tremors, seizures, syncope, facial asymmetry, speech difficulty, weakness, light-headedness and numbness.       Objective   /80   Pulse 88   Ht 1.702 m (5' 7\")   Wt 96.8 kg (213 lb 8 oz)   SpO2 98%   BMI 33.44 kg/m²     Physical Exam  Not in acute distress  No pallor or icterus.  Pupils PERRLA extraocular muscles intact.  No sinus tenderness.  No facial droop  No TMJ tenderness  Right temporalis muscle tight tender.  Trapezius and scalene muscles also tight out right side.  Left side has some tightness  Neck range of motion okay  Chest is clear  CVS: S1-S2 regular  No focal neurological deficits  Assessment/Plan   Problem List Items Addressed This Visit           ICD-10-CM    Fibromyalgia M79.7    Cervicogenic headache - Primary G44.86    Relevant Medications    cyclobenzaprine (Flexeril) 10 mg tablet    Other Relevant Orders    Referral to " Physical Therapy        Headache mostly cervicogenic.  No neurological symptoms.  She had multiple MRIs and CT of head in the past  Cannot tolerate oral pain meds after COVID  Apply heat  Will give Flexeril  Therapy referral  She could benefit from TENS unit due to her chronic pain and muscle tightness and spasms.  See neurology if headache is not resolving

## 2025-05-06 ENCOUNTER — APPOINTMENT (OUTPATIENT)
Dept: PRIMARY CARE | Facility: CLINIC | Age: 50
End: 2025-05-06
Payer: COMMERCIAL

## 2025-05-07 ENCOUNTER — APPOINTMENT (OUTPATIENT)
Dept: PHYSICAL THERAPY | Facility: CLINIC | Age: 50
End: 2025-05-07
Payer: COMMERCIAL

## 2025-05-21 NOTE — PROGRESS NOTES
Physical Therapy Evaluation    Patient Name: Cydney Fox  MRN: [id number]  Today's Date: 5/22/2025  Time Calculation  Start Time: 1045  Stop Time: 1125  Time Calculation (min): 40 min     Problem List Items Addressed This Visit           ICD-10-CM    Cervicogenic headache G44.86    Relevant Orders    Follow Up In Physical Therapy       Insurance:  Visit number #1  Insurance Type: Payor: [employer] PLAN / Plan: [facility] [employer] PLAN / Product Type: *No Product type* / VISITS ARE MED NEC NEEDS AUTH AFTER 8V PAYS %   Authorization or Plan of Care date Range: n/a    General:  Reason for visit: cervicogenic headaches   Referred by: Nicole Reza MD Next MD appt: nothing is scheduled      Precautions:  none  Fall Risk: Low     Assessment:   Cydney Fox  is a 49 y.o. old patient who participated in a physical therapy evaluation today due to headaches. Cydney presents with signs and symptoms consistent with cervicogenic headaches. Cydney's impairments include: postural deficits, pain, decreased strength, decreased range of motion, soft tissue dysfunction, and decreased functional mobility.  Due to these impairments, she has the following functional limitations and participation restrictions: difficulty performing household activities, difficulty performing occupational activities, difficulty with sleeping, difficulty with completing/performing some ADLs/IADLs, and increased time to complete ADLs/IADLs. Cydney's clinical presentation is Stable and/or uncomplicated characteristics with the level of complexity being low. Cydney's potential for rehab is good. Skilled physical therapy services are appropriate and beneficial in order to achieve measurable and meaningful change in the objective tests and measures. Utilization of skilled physical therapy services will aid in advancing her functional status and attaining her therapy-related goals. Cydney verbalized understanding and is in agreement  with all goals and plan of care.  Cydney left session with all questions answered and no increase in symptoms.      Plan:  1x per week for 6-8 weeks    Recommended Treatment:  Therapeutic exercise, Manual therapy, Home program instruction and progression, Neuromuscular re-education, Therapeutic activities, Self care and home management, Instruction in activity modification, Electrical stimulation, and Cryotherapy    Goals:  Patient to be Indep with Home Exercise Program for symptom management.    NDI: 0-19% impairment to indicate a significant improvement in overall function.      Improve DNF endurance test to 10 seconds to allow for correct cervical mechanics.    Patient will demonstrate correct posture with min to no cueing to allow for correct loading strategy.    Patient will demo mild to no limitation AROM of the cervical spine to allow for correct mechanics with functional mobility.      Patient will report driving without pain to allow for return to work and ADLs without limitation.     Patient will report sitting/reading >30 min without pain to allow for return to work and ADLs without limitation.     Subjective:  CC: Patient arrives with complaint of R sided cervicogenic headaches also with neck pain  DOI: began April   DOS: n/a  RENETTA: no mechanism   IMAGING: no recent xrays   PAIN: CURRENT: (8/10); WORST: (10/10); LOCATION: (R side cervical region and head)  ALLEVIATES PAIN: hot shower, heating pad  EXACERBATES PAIN: housework, sleeping, sitting, reading  CURRENT MEDICAL MANAGEMENT: difficulty taking pain medication  PLOF: prior pain low back, knee, some cervical  FUNCTIONAL LIMITATIONS: Reaching, Lifting, Sleep is interrupted., Driving, and sitting  LIVING ENVIRONMENT: no barriers  WORK: not working 10 years ago since cancer dx  EXERCISE: minimal at this time  PATIENT'S GOAL: loosen up muscles around neck and shoulders and stop headaches    Medical History Form: Reviewed (scanned into chart)    Objective:  "  POSTURE:   Posture assessment positive for: forward head shoulders rounded forward  Protruded head: Yes;     DERMATOMES UE:   Patient denies altered sensation in the upper extremities.    PALPATION: tenderness R side paraspinals, UT/levator    NECK AROM MOVEMENT LOSS:  Protrusion: Nil  Flexion: Minimal  Retraction: Moderate  Extension: Moderate  Sidebending(R): Moderate.    Sidebending (L): Moderate.    Rotation (R): Moderate  Rotation (L): Moderate    UPPER EXTREMITY MUSCLE STRENGTH:  Upper Extremity Myotomes are WNL bilaterally    NECK REPEATED MOTIONS:    PRO: No worse  Rep PRO: No worse  RET: No worse  Rep RET: No worse  RET EXT: No worse      Outcome Measures:  Other Measures  Neck Disability Index: 32     Treatment Performed: (\"NP\" = Not Performed)     Therapeutic Exercise:  15 minutes  Access Code: [email or web address]  URL: [email or web address]  Date: 05/22/2025  Prepared by: Diaz Hancock    Exercises  - Seated Cervical Retraction  - 1 x daily - 7 x weekly - 20 reps - 5 hold  - Supine Cervical Retraction with Towel  - 1 x daily - 7 x weekly - 20 reps - 5 hold  - Seated Scapular Retraction  - 1 x daily - 7 x weekly - 20 reps  - Seated Shoulder Shrug Circles AROM Backward  - 1 x daily - 7 x weekly - 20 reps  - Seated Upper Trapezius Stretch  - 1 x daily - 7 x weekly - 3 reps - 30 hold  - Seated Levator Scapulae Stretch  - 1 x daily - 7 x weekly - 3 reps - 30 hold    Manual Therapy:    minutes      Neuromuscular Re-education:  minutes      Gait Training:    minutes      Therapeutic Activity:  minutes      Modalities:  Vasopneumatic Device  minutes  Electrical Stimulation  minutes  Ultrasound  minutes  Iontophoresis  minutes  Cold Pack  minutes    Evaluation Complexity: Low: 25 minutes; Moderate   minutes; Complex PT Evaluation Time Entry: 25;  minutes    Re-Evaluation:   minutes    "

## 2025-05-22 ENCOUNTER — EVALUATION (OUTPATIENT)
Dept: PHYSICAL THERAPY | Facility: CLINIC | Age: 50
End: 2025-05-22
Payer: COMMERCIAL

## 2025-05-22 DIAGNOSIS — G44.86 CERVICOGENIC HEADACHE: ICD-10-CM

## 2025-05-22 PROCEDURE — 97161 PT EVAL LOW COMPLEX 20 MIN: CPT | Mod: GP | Performed by: PHYSICAL THERAPIST

## 2025-05-22 PROCEDURE — 97110 THERAPEUTIC EXERCISES: CPT | Mod: GP | Performed by: PHYSICAL THERAPIST

## 2025-05-28 LAB — NON-UH HIE HGB A1C: 5.9 %

## 2025-06-04 ENCOUNTER — TREATMENT (OUTPATIENT)
Dept: PHYSICAL THERAPY | Facility: CLINIC | Age: 50
End: 2025-06-04
Payer: COMMERCIAL

## 2025-06-04 DIAGNOSIS — G44.86 CERVICOGENIC HEADACHE: ICD-10-CM

## 2025-06-04 PROCEDURE — 97140 MANUAL THERAPY 1/> REGIONS: CPT | Mod: GP | Performed by: PHYSICAL THERAPIST

## 2025-06-04 PROCEDURE — 97110 THERAPEUTIC EXERCISES: CPT | Mod: GP | Performed by: PHYSICAL THERAPIST

## 2025-06-04 NOTE — PROGRESS NOTES
"PHYSICAL THERAPY TREATMENT NOTE    Patient Name:  Cydney Fox   Patient MRN: [id number]  Date: 6/4/2025  Time Calculation  Start Time: 0230  Stop Time: 0315  Time Calculation (min): 45 min    Problem List Items Addressed This Visit           ICD-10-CM    Cervicogenic headache G44.86        Insurance:  Visit number #2  Insurance Type: Payor: [employer] PLAN / Plan: [facility] / Product Type: *No Product type* / VISITS ARE MED NEC NEEDS AUTH AFTER 8V PAYS %   Authorization or Plan of Care date Range: n/a    General:  Reason for visit: cervicogenic headaches  Referred by: Nicole Reza MD   Next MD appt:  nothing is scheduled   Surgery: No surgery found    Precautions:  none  Fall Risk: Low    Subjective:  Cydney reports she feels like her condition is neither improving nor worsening.  States some improvement with initial exercises. Symptoms continue R side. Purchased a TENS unit  Pain (0-10): 5   HEP adherence / understanding: patient reports compliance with the instructed home exercises.    Assessment:  Education: Reviewed home exercise program. Provided verbal feedback to improve exercise technique.  Progress towards functional goals: Improved ability to sleep thru the night. Reduced frequency of pain. Reduced intensity of pain.  Response to interventions: Tolerated treatment session well without any increase in pain. Improved joint mobility.  Improved independence with home exercises.  Justification for continued skilled care: To address remaining functional, objective and subjective deficits to allow the patient to return to full independence with ADLs. Progressive strengthening to stabilize the spine to improve function. Modify and progress home exercise program. Reduce pain to improve function.    Plan:  Continue manual therapy, progress postural strengthening, consider isometrics.     Objective:      Treatment Performed: (\"NP\" = Not Performed) (\"NV\" = Next " Visit)    HEP: Access Code: [email or web address]     Therapeutic Exercise: 30 minutes    UBE 5 min retro  UT and levator stretch 30s x 3  Seated TB bilateral ER/ABD Y x20 ea  Reviewed cervical and scap retraction  Reviewed TENS unit and instructed with home use    Manual Therapy: 15 minutes  Performed manual STM and cervical distraction in supine    Neuromuscular Re-education:  minutes      Therapeutic Activity:  minutes      Gait Training:   minutes      Modalities:  Vasopneumatic Device  minutes  Electrical Stimulation  minutes  Ultrasound  minutes  Iontophoresis  minutes  Cold Pack  minutes    Evaluation Complexity: Low:   minutes; Moderate   minutes; Complex   minutes    Re-Evaluation:   minutes

## 2025-06-12 ENCOUNTER — TREATMENT (OUTPATIENT)
Dept: PHYSICAL THERAPY | Facility: CLINIC | Age: 50
End: 2025-06-12
Payer: COMMERCIAL

## 2025-06-12 DIAGNOSIS — G44.86 CERVICOGENIC HEADACHE: ICD-10-CM

## 2025-06-12 PROCEDURE — 97140 MANUAL THERAPY 1/> REGIONS: CPT | Mod: GP | Performed by: PHYSICAL THERAPIST

## 2025-06-12 PROCEDURE — 97110 THERAPEUTIC EXERCISES: CPT | Mod: GP | Performed by: PHYSICAL THERAPIST

## 2025-06-12 NOTE — PROGRESS NOTES
"PHYSICAL THERAPY TREATMENT NOTE    Patient Name:  Cydney Fox   Patient MRN: [id number]  Date: 6/12/2025  Time Calculation  Start Time: 1210  Stop Time: 1250  Time Calculation (min): 40 min    Problem List Items Addressed This Visit           ICD-10-CM    Cervicogenic headache G44.86        Insurance:  Visit number #3  Insurance Type: Payor: [employer] PLAN / [facility]: [facility] / Product Type: *No Product type* / VISITS ARE MED NEC NEEDS AUTH AFTER 8V PAYS %   Authorization or Plan of Care date Range: n/a    General:  Reason for visit: cervicogenic headaches  Referred by: Nicole Reza MD   Next MD appt:  nothing is scheduled   Surgery: No surgery found    Precautions:  none  Fall Risk: Low    Subjective:  Cydney reports she feels like her condition is improving, states her headaches are a little less intense, TENS is working when needed.   Pain (0-10): 4  HEP adherence / understanding: patient reports compliance with the instructed home exercises.    Assessment:  Education: Reviewed home exercise program. Provided verbal feedback to improve exercise technique.  Progress towards functional goals: Improved ability to sleep thru the night. Reduced frequency of pain. Reduced intensity of pain.  Response to interventions: Tolerated treatment session well without any increase in pain. Improved joint mobility.  Improved independence with home exercises.  Justification for continued skilled care: To address remaining functional, objective and subjective deficits to allow the patient to return to full independence with ADLs. Progressive strengthening to stabilize the spine to improve function. Modify and progress home exercise program. Reduce pain to improve function.    Plan:  Continue manual therapy, progress postural strengthening.     Objective:      Treatment Performed: (\"NP\" = Not Performed) (\"NV\" = Next Visit)    HEP: Access Code: [email or web address]     Therapeutic Exercise: 25 " minutes    UBE 5 min retro  UT and levator stretch 30s x 3  TB row/ext red x20 ea  TB bilateral ER/ABD Y x20 ea against wall  Standing shoulder flex/abd against wall x 20 ea  Reviewed cervical and scap retraction-NP  Reviewed TENS unit and instructed with home use-NP    Manual Therapy: 15 minutes  Performed manual STM and cervical distraction in supine    Neuromuscular Re-education:  minutes      Therapeutic Activity:  minutes      Gait Training:   minutes      Modalities:  Vasopneumatic Device  minutes  Electrical Stimulation  minutes  Ultrasound  minutes  Iontophoresis  minutes  Cold Pack  minutes    Evaluation Complexity: Low:   minutes; Moderate   minutes; Complex   minutes    Re-Evaluation:   minutes

## 2025-06-19 ENCOUNTER — APPOINTMENT (OUTPATIENT)
Dept: PHYSICAL THERAPY | Facility: CLINIC | Age: 50
End: 2025-06-19
Payer: COMMERCIAL

## 2025-06-26 ENCOUNTER — APPOINTMENT (OUTPATIENT)
Dept: PHYSICAL THERAPY | Facility: CLINIC | Age: 50
End: 2025-06-26
Payer: COMMERCIAL

## 2025-06-26 NOTE — PROGRESS NOTES
"PHYSICAL THERAPY TREATMENT NOTE    Patient Name:  Cydney Fox   Patient MRN: [id number]  Date: 6/26/2025       Problem List Items Addressed This Visit    None         Insurance:  Visit number #Visit count could not be calculated. Make sure you are using a visit which is associated with an episode.  Insurance Type: Payor: [employer] PLAN / Plan: [facility] [employer] PLAN / Product Type: *No Product type* / VISITS ARE MED NEC NEEDS AUTH AFTER 8V PAYS %   Authorization or Plan of Care date Range: n/a    General:  Reason for visit: cervicogenic headaches  Referred by: Nicole Reza MD Next MD appt:  nothing is scheduled   Surgery: No surgery found    Precautions:  none  Fall Risk: Low    Subjective:  Cydney reports she feels like her condition is improving, states her headaches are a little less intense, TENS is working when needed.   Pain (0-10): 4  HEP adherence / understanding: patient reports compliance with the instructed home exercises.    Assessment:  Education: Reviewed home exercise program. Provided verbal feedback to improve exercise technique.  Progress towards functional goals: Improved ability to sleep thru the night. Reduced frequency of pain. Reduced intensity of pain.  Response to interventions: Tolerated treatment session well without any increase in pain. Improved joint mobility.  Improved independence with home exercises.  Justification for continued skilled care: To address remaining functional, objective and subjective deficits to allow the patient to return to full independence with ADLs. Progressive strengthening to stabilize the spine to improve function. Modify and progress home exercise program. Reduce pain to improve function.    Plan:  Continue manual therapy, progress postural strengthening.     Objective:      Treatment Performed: (\"NP\" = Not Performed) (\"NV\" = Next Visit)    HEP: Access Code: [email or web address]     Therapeutic Exercise:   minutes    UBE 5 min " retro  UT and levator stretch 30s x 3  TB row/ext red x20 ea  TB bilateral ER/ABD Y x20 ea against wall  Standing shoulder flex/abd against wall x 20 ea  Reviewed cervical and scap retraction-NP  Reviewed TENS unit and instructed with home use-NP    Manual Therapy:   minutes  Performed manual STM and cervical distraction in supine    Neuromuscular Re-education:  minutes      Therapeutic Activity:  minutes      Gait Training:   minutes      Modalities:  Vasopneumatic Device  minutes  Electrical Stimulation  minutes  Ultrasound  minutes  Iontophoresis  minutes  Cold Pack  minutes    Evaluation Complexity: Low:   minutes; Moderate   minutes; Complex   minutes    Re-Evaluation:   minutes

## 2025-07-02 ASSESSMENT — PROMIS GLOBAL HEALTH SCALE
EMOTIONAL_PROBLEMS: OFTEN
RATE_AVERAGE_FATIGUE: SEVERE
CARRYOUT_SOCIAL_ACTIVITIES: FAIR
RATE_PHYSICAL_HEALTH: FAIR
RATE_MENTAL_HEALTH: FAIR
RATE_AVERAGE_PAIN: 6
RATE_GENERAL_HEALTH: FAIR
CARRYOUT_PHYSICAL_ACTIVITIES: A LITTLE
CARRYOUT_PHYSICAL_ACTIVITIES: A LITTLE
EMOTIONAL_PROBLEMS: OFTEN
RATE_QUALITY_OF_LIFE: FAIR
RATE_SOCIAL_SATISFACTION: POOR
RATE_AVERAGE_PAIN: 6
RATE_SOCIAL_SATISFACTION: POOR
RATE_GENERAL_HEALTH: FAIR
RATE_QUALITY_OF_LIFE: FAIR
RATE_PHYSICAL_HEALTH: FAIR
RATE_AVERAGE_FATIGUE: SEVERE
CARRYOUT_SOCIAL_ACTIVITIES: FAIR
RATE_MENTAL_HEALTH: FAIR

## 2025-07-03 ENCOUNTER — TREATMENT (OUTPATIENT)
Dept: PHYSICAL THERAPY | Facility: CLINIC | Age: 50
End: 2025-07-03
Payer: COMMERCIAL

## 2025-07-03 DIAGNOSIS — G44.86 CERVICOGENIC HEADACHE: ICD-10-CM

## 2025-07-03 PROCEDURE — 97140 MANUAL THERAPY 1/> REGIONS: CPT | Mod: GP | Performed by: PHYSICAL THERAPIST

## 2025-07-03 PROCEDURE — 97110 THERAPEUTIC EXERCISES: CPT | Mod: GP | Performed by: PHYSICAL THERAPIST

## 2025-07-03 NOTE — PROGRESS NOTES
"PHYSICAL THERAPY TREATMENT NOTE    Patient Name:  Cydney Fox   Patient MRN: [id number]  Date: 7/3/2025  Time Calculation  Start Time: 0200  Stop Time: 0240  Time Calculation (min): 40 min    Problem List Items Addressed This Visit           ICD-10-CM    Cervicogenic headache G44.86     Insurance:  Visit number #4  Insurance Type: Payor: [employer] PLAN / Plan: [employer] PLAN / Product Type: *No Product type* / VISITS ARE MED NEC NEEDS AUTH AFTER 8V PAYS %   Authorization or Plan of Care date Range: n/a    General:  Reason for visit: cervicogenic headaches  Referred by: Nicole Reza MD Next MD appt:  nothing is scheduled   Surgery: No surgery found    Precautions:  none  Fall Risk: Low    Subjective:  Cydney reports she feels like her condition is improving, less headaches, mostly tightness R side. Has been feeling bad due to the summer heat with breathing   Pain (0-10): 1-2  HEP adherence / understanding: patient reports compliance with the instructed home exercises.    Assessment:  Education: Reviewed home exercise program. Provided verbal feedback to improve exercise technique.  Progress towards functional goals: Improved ability to sleep thru the night. Reduced frequency of pain. Reduced intensity of pain.  Response to interventions: Tolerated treatment session well without any increase in pain. Improved joint mobility.  Improved independence with home exercises.  Justification for continued skilled care: To address remaining functional, objective and subjective deficits to allow the patient to return to full independence with ADLs. Progressive strengthening to stabilize the spine to improve function. Modify and progress home exercise program. Reduce pain to improve function.    Plan:  Continue manual therapy, progress postural strengthening.     Objective:  Cervical AROM:  Flex min  Ext min  Sidebend R=mod, L=min  Rotation R=min, L=min    Treatment Performed: (\"NP\" = " "Not Performed) (\"NV\" = Next Visit)    HEP: Access Code: [email or web address]     Therapeutic Exercise: 25 minutes    UBE 5 min retro  UT and levator stretch 30s x 3  TB row/ext green x20 ea  TB bilateral ER/ABD Y x20 ea against wall  Standing shoulder flex/abd against wall x 20 ea  Reviewed cervical and scap retraction-NP  Reviewed TENS unit and instructed with home use-NP    Manual Therapy: 15 minutes  Performed manual STM and cervical distraction in supine    Neuromuscular Re-education:  minutes      Therapeutic Activity:  minutes      Gait Training:   minutes      Modalities:  Vasopneumatic Device  minutes  Electrical Stimulation  minutes  Ultrasound  minutes  Iontophoresis  minutes  Cold Pack  minutes    Evaluation Complexity: Low:   minutes; Moderate   minutes; Complex   minutes    Re-Evaluation:   minutes  "

## 2025-07-09 ENCOUNTER — APPOINTMENT (OUTPATIENT)
Dept: PRIMARY CARE | Facility: CLINIC | Age: 50
End: 2025-07-09
Payer: COMMERCIAL

## 2025-07-09 ASSESSMENT — PROMIS GLOBAL HEALTH SCALE
RATE_SOCIAL_SATISFACTION: POOR
RATE_AVERAGE_PAIN: 6
RATE_MENTAL_HEALTH: FAIR
CARRYOUT_PHYSICAL_ACTIVITIES: A LITTLE
RATE_SOCIAL_SATISFACTION: POOR
EMOTIONAL_PROBLEMS: OFTEN
RATE_AVERAGE_PAIN: 6
RATE_MENTAL_HEALTH: FAIR
CARRYOUT_PHYSICAL_ACTIVITIES: A LITTLE
RATE_PHYSICAL_HEALTH: FAIR
RATE_QUALITY_OF_LIFE: FAIR
EMOTIONAL_PROBLEMS: OFTEN
CARRYOUT_SOCIAL_ACTIVITIES: FAIR
RATE_AVERAGE_FATIGUE: SEVERE
RATE_QUALITY_OF_LIFE: FAIR
RATE_GENERAL_HEALTH: FAIR
RATE_GENERAL_HEALTH: FAIR
RATE_AVERAGE_FATIGUE: SEVERE
RATE_PHYSICAL_HEALTH: FAIR
CARRYOUT_SOCIAL_ACTIVITIES: FAIR

## 2025-07-10 ENCOUNTER — APPOINTMENT (OUTPATIENT)
Dept: PHYSICAL THERAPY | Facility: CLINIC | Age: 50
End: 2025-07-10
Payer: COMMERCIAL

## 2025-07-10 NOTE — PROGRESS NOTES
"PHYSICAL THERAPY TREATMENT NOTE    Patient Name:  Cydney Fox   Patient MRN: [id number]  Date: 7/10/2025       Problem List Items Addressed This Visit    None      Insurance:  Visit number #Visit count could not be calculated. Make sure you are using a visit which is associated with an episode.  Insurance Type: Payor: [facility] / Plan: [employer] PLAN / Product Type: *No Product type* / VISITS ARE MED NEC NEEDS AUTH AFTER 8V PAYS %   Authorization or Plan of Care date Range: n/a    General:  Reason for visit: cervicogenic headaches  Referred by: Nicole Reza MD Next MD appt:  nothing is scheduled   Surgery: No surgery found    Precautions:  none  Fall Risk: Low    Subjective:  Cydney reports she feels like her condition is improving, less headaches, mostly tightness R side. Has been feeling bad due to the summer heat with breathing   Pain (0-10): 1-2  HEP adherence / understanding: patient reports compliance with the instructed home exercises.    Assessment:  Education: Reviewed home exercise program. Provided verbal feedback to improve exercise technique.  Progress towards functional goals: Improved ability to sleep thru the night. Reduced frequency of pain. Reduced intensity of pain.  Response to interventions: Tolerated treatment session well without any increase in pain. Improved joint mobility.  Improved independence with home exercises.  Justification for continued skilled care: To address remaining functional, objective and subjective deficits to allow the patient to return to full independence with ADLs. Progressive strengthening to stabilize the spine to improve function. Modify and progress home exercise program. Reduce pain to improve function.    Plan:  Continue manual therapy, progress postural strengthening.     Objective:  Cervical AROM:  Flex min  Ext min  Sidebend R=mod, L=min  Rotation R=min, L=min    Treatment Performed: (\"NP\" = Not Performed) (\"NV\" " = Next Visit)    HEP: Access Code: [email or web address]     Therapeutic Exercise:   minutes    UBE 5 min retro  UT and levator stretch 30s x 3  TB row/ext green x20 ea  TB bilateral ER/ABD Y x20 ea against wall  Standing shoulder flex/abd against wall x 20 ea  Reviewed cervical and scap retraction-NP  Reviewed TENS unit and instructed with home use-NP    Manual Therapy:   minutes  Performed manual STM and cervical distraction in supine    Neuromuscular Re-education:  minutes      Therapeutic Activity:  minutes      Gait Training:   minutes      Modalities:  Vasopneumatic Device  minutes  Electrical Stimulation  minutes  Ultrasound  minutes  Iontophoresis  minutes  Cold Pack  minutes    Evaluation Complexity: Low:   minutes; Moderate   minutes; Complex   minutes    Re-Evaluation:   minutes

## 2025-07-16 ENCOUNTER — APPOINTMENT (OUTPATIENT)
Dept: PRIMARY CARE | Facility: CLINIC | Age: 50
End: 2025-07-16
Payer: COMMERCIAL

## 2025-08-11 ENCOUNTER — APPOINTMENT (OUTPATIENT)
Dept: PHYSICAL THERAPY | Facility: CLINIC | Age: 50
End: 2025-08-11
Payer: COMMERCIAL

## 2025-08-18 ENCOUNTER — APPOINTMENT (OUTPATIENT)
Dept: PHYSICAL THERAPY | Facility: CLINIC | Age: 50
End: 2025-08-18
Payer: COMMERCIAL

## 2025-08-28 ENCOUNTER — APPOINTMENT (OUTPATIENT)
Dept: PRIMARY CARE | Facility: CLINIC | Age: 50
End: 2025-08-28
Payer: COMMERCIAL

## 2025-08-28 LAB
NON-UH HIE CREATININE, URINE MG/DL: 244.6 MG/DL
NON-UH HIE FREE T4: 1.62 NG/DL (ref 0.89–1.76)
NON-UH HIE HGB A1C: 5.9 %
NON-UH HIE MICROALBUMIN, URINE MG/L: 12 MG/L
NON-UH HIE MICROALBUMIN/CREATININE RATIO: 5 MG MALB/GM CREAT (ref 0–30)
NON-UH HIE TSH: 1.39 UIU/ML (ref 0.55–4.78)

## 2025-08-31 ENCOUNTER — OFFICE VISIT (OUTPATIENT)
Dept: URGENT CARE | Age: 50
End: 2025-08-31
Payer: COMMERCIAL

## 2025-08-31 ASSESSMENT — ENCOUNTER SYMPTOMS
CARDIOVASCULAR NEGATIVE: 1
GASTROINTESTINAL NEGATIVE: 1
HEADACHES: 1
SORE THROAT: 1
FEVER: 0
COUGH: 1

## 2025-09-05 DIAGNOSIS — K29.00 ACUTE GASTRITIS WITHOUT HEMORRHAGE, UNSPECIFIED GASTRITIS TYPE: ICD-10-CM

## 2025-09-05 RX ORDER — PANTOPRAZOLE SODIUM 40 MG/1
40 TABLET, DELAYED RELEASE ORAL DAILY
Qty: 90 TABLET | Refills: 0 | Status: SHIPPED | OUTPATIENT
Start: 2025-09-05

## 2025-09-10 ENCOUNTER — APPOINTMENT (OUTPATIENT)
Dept: PRIMARY CARE | Facility: CLINIC | Age: 50
End: 2025-09-10
Payer: COMMERCIAL